# Patient Record
Sex: FEMALE | Race: WHITE | NOT HISPANIC OR LATINO
[De-identification: names, ages, dates, MRNs, and addresses within clinical notes are randomized per-mention and may not be internally consistent; named-entity substitution may affect disease eponyms.]

---

## 2018-06-26 ENCOUNTER — APPOINTMENT (OUTPATIENT)
Dept: UROGYNECOLOGY | Facility: CLINIC | Age: 53
End: 2018-06-26
Payer: COMMERCIAL

## 2018-06-26 VITALS
HEART RATE: 57 BPM | SYSTOLIC BLOOD PRESSURE: 148 MMHG | BODY MASS INDEX: 27.32 KG/M2 | DIASTOLIC BLOOD PRESSURE: 80 MMHG | WEIGHT: 170 LBS | HEIGHT: 66 IN

## 2018-06-26 DIAGNOSIS — N39.41 URGE INCONTINENCE: ICD-10-CM

## 2018-06-26 DIAGNOSIS — R39.15 URGENCY OF URINATION: ICD-10-CM

## 2018-06-26 DIAGNOSIS — Z83.42 FAMILY HISTORY OF FAMILIAL HYPERCHOLESTEROLEMIA: ICD-10-CM

## 2018-06-26 DIAGNOSIS — Z82.49 FAMILY HISTORY OF ISCHEMIC HEART DISEASE AND OTHER DISEASES OF THE CIRCULATORY SYSTEM: ICD-10-CM

## 2018-06-26 LAB
BILIRUB UR QL STRIP: NORMAL
CLARITY UR: CLEAR
COLLECTION METHOD: NORMAL
GLUCOSE UR-MCNC: NORMAL
HCG UR QL: 0.2 EU/DL
HGB UR QL STRIP.AUTO: NORMAL
KETONES UR-MCNC: NORMAL
LEUKOCYTE ESTERASE UR QL STRIP: NORMAL
NITRITE UR QL STRIP: NORMAL
PH UR STRIP: 6
PROT UR STRIP-MCNC: NORMAL
SP GR UR STRIP: 1.01

## 2018-06-26 PROCEDURE — 51701 INSERT BLADDER CATHETER: CPT

## 2018-06-26 PROCEDURE — 99244 OFF/OP CNSLTJ NEW/EST MOD 40: CPT | Mod: 25

## 2018-06-27 ENCOUNTER — RESULT REVIEW (OUTPATIENT)
Age: 53
End: 2018-06-27

## 2018-06-27 PROBLEM — Z83.42 FAMILY HISTORY OF HYPERCHOLESTEROLEMIA: Status: ACTIVE | Noted: 2018-06-27

## 2018-06-27 LAB
APPEARANCE: CLEAR
BACTERIA: NEGATIVE
BILIRUBIN URINE: NEGATIVE
BLOOD URINE: NEGATIVE
COLOR: YELLOW
GLUCOSE QUALITATIVE U: NEGATIVE MG/DL
HYALINE CASTS: 0 /LPF
KETONES URINE: NEGATIVE
LEUKOCYTE ESTERASE URINE: NEGATIVE
MICROSCOPIC-UA: NORMAL
NITRITE URINE: NEGATIVE
PH URINE: 6
PROTEIN URINE: NEGATIVE MG/DL
RED BLOOD CELLS URINE: 0 /HPF
SPECIFIC GRAVITY URINE: 1.01
SQUAMOUS EPITHELIAL CELLS: 0 /HPF
UROBILINOGEN URINE: NEGATIVE MG/DL
WHITE BLOOD CELLS URINE: 0 /HPF

## 2018-06-27 RX ORDER — CHROMIUM 200 MCG
TABLET ORAL
Refills: 0 | Status: ACTIVE | COMMUNITY

## 2018-06-28 ENCOUNTER — RESULT REVIEW (OUTPATIENT)
Age: 53
End: 2018-06-28

## 2018-06-28 LAB — BACTERIA UR CULT: NORMAL

## 2018-06-29 ENCOUNTER — RESULT REVIEW (OUTPATIENT)
Age: 53
End: 2018-06-29

## 2019-04-08 ENCOUNTER — APPOINTMENT (OUTPATIENT)
Dept: UROGYNECOLOGY | Facility: CLINIC | Age: 54
End: 2019-04-08

## 2019-04-15 ENCOUNTER — APPOINTMENT (OUTPATIENT)
Dept: UROGYNECOLOGY | Facility: CLINIC | Age: 54
End: 2019-04-15

## 2019-08-28 ENCOUNTER — APPOINTMENT (OUTPATIENT)
Dept: ULTRASOUND IMAGING | Facility: IMAGING CENTER | Age: 54
End: 2019-08-28
Payer: COMMERCIAL

## 2019-08-28 ENCOUNTER — OUTPATIENT (OUTPATIENT)
Dept: OUTPATIENT SERVICES | Facility: HOSPITAL | Age: 54
LOS: 1 days | End: 2019-08-28
Payer: COMMERCIAL

## 2019-08-28 DIAGNOSIS — Z00.8 ENCOUNTER FOR OTHER GENERAL EXAMINATION: ICD-10-CM

## 2019-08-28 PROCEDURE — 76856 US EXAM PELVIC COMPLETE: CPT

## 2019-08-28 PROCEDURE — 76830 TRANSVAGINAL US NON-OB: CPT

## 2019-08-28 PROCEDURE — 76856 US EXAM PELVIC COMPLETE: CPT | Mod: 26,59

## 2019-08-28 PROCEDURE — 76830 TRANSVAGINAL US NON-OB: CPT | Mod: 26

## 2019-09-12 ENCOUNTER — EMERGENCY (EMERGENCY)
Facility: HOSPITAL | Age: 54
LOS: 1 days | Discharge: HOME | End: 2019-09-12
Attending: EMERGENCY MEDICINE
Payer: COMMERCIAL

## 2019-09-12 VITALS
HEIGHT: 66 IN | OXYGEN SATURATION: 99 % | HEART RATE: 61 BPM | WEIGHT: 169.98 LBS | TEMPERATURE: 98 F | RESPIRATION RATE: 16 BRPM | DIASTOLIC BLOOD PRESSURE: 77 MMHG | SYSTOLIC BLOOD PRESSURE: 174 MMHG

## 2019-09-12 LAB
ALBUMIN SERPL ELPH-MCNC: 4.7 G/DL — SIGNIFICANT CHANGE UP (ref 3.3–5)
ALP SERPL-CCNC: 63 U/L — SIGNIFICANT CHANGE UP (ref 40–120)
ALT FLD-CCNC: 18 U/L — SIGNIFICANT CHANGE UP (ref 10–45)
ANION GAP SERPL CALC-SCNC: 15 MMOL/L — SIGNIFICANT CHANGE UP (ref 5–17)
AST SERPL-CCNC: 20 U/L — SIGNIFICANT CHANGE UP (ref 10–40)
BILIRUB SERPL-MCNC: 1.3 MG/DL — HIGH (ref 0.2–1.2)
BUN SERPL-MCNC: 15 MG/DL — SIGNIFICANT CHANGE UP (ref 7–23)
CALCIUM SERPL-MCNC: 10 MG/DL — SIGNIFICANT CHANGE UP (ref 8.4–10.5)
CHLORIDE SERPL-SCNC: 103 MMOL/L — SIGNIFICANT CHANGE UP (ref 96–108)
CO2 SERPL-SCNC: 25 MMOL/L — SIGNIFICANT CHANGE UP (ref 22–31)
CREAT SERPL-MCNC: 0.74 MG/DL — SIGNIFICANT CHANGE UP (ref 0.5–1.3)
GLUCOSE SERPL-MCNC: 101 MG/DL — HIGH (ref 70–99)
HCT VFR BLD CALC: 40.4 % — SIGNIFICANT CHANGE UP (ref 34.5–45)
HGB BLD-MCNC: 13.2 G/DL — SIGNIFICANT CHANGE UP (ref 11.5–15.5)
MCHC RBC-ENTMCNC: 31.7 PG — SIGNIFICANT CHANGE UP (ref 27–34)
MCHC RBC-ENTMCNC: 32.6 GM/DL — SIGNIFICANT CHANGE UP (ref 32–36)
MCV RBC AUTO: 97.2 FL — SIGNIFICANT CHANGE UP (ref 80–100)
PLATELET # BLD AUTO: 256 K/UL — SIGNIFICANT CHANGE UP (ref 150–400)
POTASSIUM SERPL-MCNC: 4 MMOL/L — SIGNIFICANT CHANGE UP (ref 3.5–5.3)
POTASSIUM SERPL-SCNC: 4 MMOL/L — SIGNIFICANT CHANGE UP (ref 3.5–5.3)
PROT SERPL-MCNC: 7.8 G/DL — SIGNIFICANT CHANGE UP (ref 6–8.3)
RBC # BLD: 4.16 M/UL — SIGNIFICANT CHANGE UP (ref 3.8–5.2)
RBC # FLD: 11.7 % — SIGNIFICANT CHANGE UP (ref 10.3–14.5)
SODIUM SERPL-SCNC: 143 MMOL/L — SIGNIFICANT CHANGE UP (ref 135–145)
TROPONIN T, HIGH SENSITIVITY RESULT: 8 NG/L — SIGNIFICANT CHANGE UP (ref 0–51)
TROPONIN T, HIGH SENSITIVITY RESULT: <6 NG/L — SIGNIFICANT CHANGE UP (ref 0–51)
WBC # BLD: 5.6 K/UL — SIGNIFICANT CHANGE UP (ref 3.8–10.5)
WBC # FLD AUTO: 5.6 K/UL — SIGNIFICANT CHANGE UP (ref 3.8–10.5)

## 2019-09-12 PROCEDURE — 93010 ELECTROCARDIOGRAM REPORT: CPT

## 2019-09-12 PROCEDURE — 99284 EMERGENCY DEPT VISIT MOD MDM: CPT | Mod: GC

## 2019-09-12 PROCEDURE — 99218: CPT

## 2019-09-12 PROCEDURE — 71046 X-RAY EXAM CHEST 2 VIEWS: CPT | Mod: 26

## 2019-09-12 PROCEDURE — 75574 CT ANGIO HRT W/3D IMAGE: CPT | Mod: 26

## 2019-09-12 RX ORDER — ATORVASTATIN CALCIUM 80 MG/1
40 TABLET, FILM COATED ORAL AT BEDTIME
Refills: 0 | Status: DISCONTINUED | OUTPATIENT
Start: 2019-09-12 | End: 2019-09-16

## 2019-09-12 RX ORDER — SIMVASTATIN 20 MG/1
20 TABLET, FILM COATED ORAL AT BEDTIME
Refills: 0 | Status: DISCONTINUED | OUTPATIENT
Start: 2019-09-12 | End: 2019-09-12

## 2019-09-12 RX ORDER — LOSARTAN POTASSIUM 100 MG/1
100 TABLET, FILM COATED ORAL DAILY
Refills: 0 | Status: DISCONTINUED | OUTPATIENT
Start: 2019-09-12 | End: 2019-09-16

## 2019-09-12 RX ORDER — ASPIRIN/CALCIUM CARB/MAGNESIUM 324 MG
324 TABLET ORAL ONCE
Refills: 0 | Status: COMPLETED | OUTPATIENT
Start: 2019-09-12 | End: 2019-09-12

## 2019-09-12 RX ORDER — LOSARTAN POTASSIUM 100 MG/1
50 TABLET, FILM COATED ORAL DAILY
Refills: 0 | Status: DISCONTINUED | OUTPATIENT
Start: 2019-09-12 | End: 2019-09-12

## 2019-09-12 RX ADMIN — ATORVASTATIN CALCIUM 40 MILLIGRAM(S): 80 TABLET, FILM COATED ORAL at 21:11

## 2019-09-12 RX ADMIN — LOSARTAN POTASSIUM 100 MILLIGRAM(S): 100 TABLET, FILM COATED ORAL at 21:11

## 2019-09-12 RX ADMIN — Medication 324 MILLIGRAM(S): at 13:43

## 2019-09-12 NOTE — ED PROVIDER NOTE - PHYSICAL EXAMINATION
General: well appearing, interactive, well nourished, no apparent distress, ncat  HEENT: EOMI, PERRLA, normal mucosa, normal oropharynx, no lesions on the lips or on oral mucosa, normal external ear  Neck: supple, no lymphadenopathy, full range of motion, no nuchal rigidity  CV: RRR, normal S1 and S2 with no murmur, capillary refill less than two seconds, pp 2+  Resp: lungs CTA b/l, good aeration bilaterally, symmetric chest wall   Abd: non-distended, soft, non-tender  : no CVA tenderness  MSK: full range of motion, no cyanosis, no edema, no clubbing, no immobility  Neuro: CN2-12 grossly intact. EOMI. 5/5 strength in UE and LE b/l.  Sensation intact in UE/LE b/l b/l.  No dysdiadochokinesia.   Skin: no rashes, skin intact

## 2019-09-12 NOTE — ED ADULT NURSE NOTE - OBJECTIVE STATEMENT
54 year old female patient presents via wheelchair from work c/o dizziness and high blood pressure. 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 Patient states that once she arrived in the ED at 10am, she started having tightness to jaw and numbness and tingling to b/l hands. Patient denies previous similar episodes and states she was otherwise feeling well prior to today, but noted BP to be elevated at orthopedists apt earlier in the week. Patient reports recent med change where she was taken off of hydrochlorothiazide about a month ago by request. Patient denies current chest pain, palpitations, SOB, abd pain, n/v/d, fever, chills. Patient appears anxious but in no acute distress. EKG completed and patient placed on CM

## 2019-09-12 NOTE — ED CDU PROVIDER INITIAL DAY NOTE - ATTENDING CONTRIBUTION TO CARE
54F, pmh htn, presents with chest pain, jaw tightness, onset @ ~930 AM. tightness across chest, constant, radiates to jaw. a/w lightheadedness, tingling to both hands. no associated sob. no cough, congestion, f/c. no n/v/d. no abd pain. denies calf pain/swelling. denies hx pe/dvt.    PE: NAD, NCAT, MMM, Trachea midline, Normal conjunctiva, lungs CTAB, S1/S2 RRR, Normal perfusion, 2+ radial pulses bilat, Abdomen Soft, NTND, No rebound/guarding, No LE edema, No deformity of extremities, No rashes,  No focal motor or sensory deficits.    In ED, trop negative, labs otherwise non-actionable. CXR unremarkable. Plan for cdu for nuclear stress, monitoring, re-eval. - Miguel A Mathias MD

## 2019-09-12 NOTE — CONSULT NOTE ADULT - NSHPATTENDINGPLANDISCUSS_GEN_ALL_CORE
Plan discussed with cardiology fellow, Dr. Chapman; patient seen and examined.       I was physically present for the key portions of the evaluation and management (E/M) service provided.    I agree with the above history, physical, and plan which I have reviewed and edited where appropriate.

## 2019-09-12 NOTE — ED CDU PROVIDER INITIAL DAY NOTE - FAMILY HISTORY
Grandparent  Still living? Unknown  Family history of coronary artery disease, Age at diagnosis: Age Unknown

## 2019-09-12 NOTE — ED CDU PROVIDER INITIAL DAY NOTE - CHIEF COMPLAINT
The patient is a 54y Female complaining of dizziness. The patient is a 54y Female complaining of jaw tightness

## 2019-09-12 NOTE — CONSULT NOTE ADULT - SUBJECTIVE AND OBJECTIVE BOX
Patient seen and evaluated at bedside    Chief Complaint:  chest pain    HPI: 55 y/o F w/ hx of HTN, HLD p/w chest pain for 1 day.  Pt states about 1 month ago she discontinued her HCTZ for BP management.  Pt states she noticed a slow increase in BP since then, BP normally well controlled w/ -120, however over the last few days with  to 160.  Pt was at work today where she states she felt a little lightheaded with jaw tightness, had her BP checked and it was 200.  Pt was instructed to go down to the ED, where she was given ASA 325mg.  2 hours later while in the ED, she had a mild self-limiting episode of substernal chest pressure.  Since then, pt has been chest pain free however had a CT coronaries which showed possible non-obstructed CAD with RCA 50-60%.  Pt states she normally runs 14 miles without issue. BP had came down on its own, and cardiology was consulted for evaluation of jaw claudication and chest pain.    PMHx:   Hypertension      PSHx: breast reduction surgery      Allergies:  procaine (Anaphylaxis)      Home Meds:  simvastatin 20mg, losartan 50mg daily    Current Medications:   atorvastatin 40 milliGRAM(s) Oral at bedtime  losartan 100 milliGRAM(s) Oral daily      FAMILY HISTORY:  Family history of coronary artery disease (Grandparent)      Social History:  Smoking History:denies  Alcohol Use:denies  Drug Use:denies    REVIEW OF SYSTEMS:  Constitutional:     [ x] negative [ ] fevers [ ] chills [ ] weight loss [ ] weight gain  HEENT:                  [x ] negative [ ] dry eyes [ ] eye irritation [ ] postnasal drip [ ] nasal congestion  CV:                         [x ] negative  [ ] chest pain [ ] orthopnea [ ] palpitations [ ] murmur  Resp:                     [ x] negative [ ] cough [ ] shortness of breath [ ] dyspnea [ ] wheezing [ ] sputum [ ]hemoptysis  GI:                          [x ] negative [ ] nausea [ ] vomiting [ ] diarrhea [ ] constipation [ ] abd pain [ ] dysphagia   :                        [ x] negative [ ] dysuria [ ] nocturia [ ] hematuria [ ] increased urinary frequency  Musculoskeletal: [ x] negative [ ] back pain [ ] myalgias [ ] arthralgias [ ] fracture  Skin:                       [x ] negative [ ] rash [ ] itch  Neurological:        [ x] negative [ ] headache [ ] dizziness [ ] syncope [ ] weakness [ ] numbness  Psychiatric:           [ x] negative [ ] anxiety [ ] depression  Endocrine:            [ x] negative [ ] diabetes [ ] thyroid problem  Heme/Lymph:      [ x] negative [ ] anemia [ ] bleeding problem  Allergic/Immune: [ x] negative [ ] itchy eyes [ ] nasal discharge [ ] hives [ ] angioedema    [x ] All other systems negative  [ ] Unable to assess ROS due to      Physical Exam:  T(F): 99 (09-12), Max: 99 (09-12)  HR: 48 (09-12) (48 - 61)  BP: 129/66 (09-12) (129/66 - 183/98)  RR: 18 (09-12)  SpO2: 96% (09-12)    GEN: NAD, comfortable, pleasant  HEENT: EOMI, MMM  CV:  RRR, normal s1/s2, no m/r/g, no JVD  PULM:  CTA b/l, no w/r/r  ABD: Soft, NT/ND, BS+  EXT: WWP, no c/c/e    Cardiovascular Diagnostic Testing:    EKG:  sinus zain, no ischemic changes    Imaging:     CTA coronaries  IMPRESSION:    1.  Total coronary artery calcium score: 84.    2.  Mixed plaques within the proximal and mid LAD with mild   (approximately 30%) stenosis.    3.  Mixed plaques within the proximal to mid RCA withmoderate   (approximately 50-60%) stenosis.    CXR: Personally reviewed    Labs: Personally reviewed                        13.2   5.6   )-----------( 256      ( 12 Sep 2019 10:59 )             40.4     09-12    143  |  103  |  15  ----------------------------<  101<H>  4.0   |  25  |  0.74    Ca    10.0      12 Sep 2019 10:59    TPro  7.8  /  Alb  4.7  /  TBili  1.3<H>  /  DBili  x   /  AST  20  /  ALT  18  /  AlkPhos  63  09-12 Chief Complaint:  chest pain    55 y/o F w/ hx of HTN, HLD p/w chest pain for 1 day.  Pt states about 1 month ago, she discontinued her HCTZ.  Pt states she noticed a slow increase in BP since then; BP previously had been well controlled w/ -120.  Over the  the last few days, SBP has ranged from 130 to 160.    Pt was at work today and felt a little lightheaded with jaw tightness; BP was checked and it was 200 mmHg systolic.  Pt was instructed to go down to the ED, where she was given ASA 325mg.    Two hours later while in the ED, she had a mild self-limiting episode of substernal chest pressure.  Since then, pt has been chest pain free.  CTA of coronaries performed which describes non-obstructived CAD, with RCA 50-60%.  Pt states she normally runs 14 miles without issue.       PMHx:   Hypertension    PSHx: breast reduction surgery    Allergies:  procaine (Anaphylaxis)    Home Meds:  simvastatin 20mg, losartan 50mg daily    Current Medications:   atorvastatin 40 milliGRAM(s) Oral at bedtime  losartan 100 milliGRAM(s) Oral daily    FAMILY HISTORY:  Family history of coronary artery disease (Grandparent)    Social History:  Smoking History:denies  Alcohol Use:denies  Drug Use:denies    REVIEW OF SYSTEMS:  Constitutional:     [ x] negative [ ] fevers [ ] chills [ ] weight loss [ ] weight gain  HEENT:                  [x ] negative [ ] dry eyes [ ] eye irritation [ ] postnasal drip [ ] nasal congestion  Resp:                     [ x] negative [ ] cough [ ] shortness of breath [ ] dyspnea [ ] wheezing [ ] sputum [ ]hemoptysis  GI:                          [x ] negative [ ] nausea [ ] vomiting [ ] diarrhea [ ] constipation [ ] abd pain [ ] dysphagia   :                        [ x] negative [ ] dysuria [ ] nocturia [ ] hematuria [ ] increased urinary frequency  Musculoskeletal: [ x] negative [ ] back pain [ ] myalgias [ ] arthralgias [ ] fracture  Skin:                       [x ] negative [ ] rash [ ] itch  Neurological:        [ x] negative [ ] headache [ ] dizziness [ ] syncope [ ] weakness [ ] numbness  Psychiatric:           [ x] negative [ ] anxiety [ ] depression  Endocrine:            [ x] negative [ ] diabetes [ ] thyroid problem  Heme/Lymph:      [ x] negative [ ] anemia [ ] bleeding problem  Allergic/Immune: [ x] negative [ ] itchy eyes [ ] nasal discharge [ ] hives [ ] angioedema    [x ] All other systems negative  [ ] Unable to assess ROS due to      Physical Exam:  T(F): 99 (), Max: 99 ()  HR: 48 () (48 - 61)  BP: 129/66 (-12) (129/66 - 183/98)  RR: 18 (-)  SpO2: 96% ()    EXAM:  GENERAL:  Alert, no distress  HEENT: Normocephalic, EOM intact, PERRLA  NECK: Normal carotid upstrokes, no bruit; No JVD  CHEST:  Clear to auscultation  HEART: PMI not displaced. Normal S1 and S2.  No murmur, rub or gallop.  ABDOMEN: Normal bowel sounds, no bruit. Soft, non-tender.  Liver and spleen not palpable; aorta not palpable.  EXT:  No edema, no varicosities, 2+ pulses in upper and lower extremities.  PSYCH:  A&Ox3, normal insight, no anxiety  NEURO: Non-focal  SKIN:  No rash       12 Lead ECG (19 @ 10:48)   SB at 53 BPM   P-R Interval 168 ms, QRS Duration 94 ms, Q-T Interval 420 ms   Axis 9 degrees   SINUS BRADYCARDIA, OTHERWISE NORMAL ECG        CT Heart with Coronaries (19 @ 15:13) >    INTERPRETATION:  Clinical indication: Chest pain, coronary atherosclerosis      Findings:    CALCIUM SCORE:  Left Main: calcium score equals 0.  LAD: calcium score equals 45.  CX:  calcium score equals 23.  RCA: calcium score equals 16.  TOTAL: calcium score equals 84.      Coronary Anatomy: There is no evidence for anomalous coronary arteries.     CORONARY ARTERY ANGIOGRAM FINDINGS:    Left main: The left main is a normal caliber vessel which gives rise to the LAD and circumflex arteries. No plaque or stenosis.        LAD: The LAD is a normal caliber vessel. Mixed plaques within the proximal and mid LAD with mild (approximately 30%) stenosis.      LCX: The circumflex artery is a normal caliber codominant vessel. No plaque or stenosis within the LCx. Calcified plaque within the OM1 branch of LCx with minimal (approximately 10%) stenosis.    RCA: The right coronary artery is codominant giving rise to the posterior descending artery. Mixed plaques within the proximal to mid RCA with moderate (approximately 50-60%) stenosis.    DOMINANCE: Codominant coronary artery system.    ADDITIONAL FINDINGS: None.       IMPRESSION:  1.  Total coronary artery calcium score: 84.  2.  Mixed plaques within the proximal and mid LAD with mild (approximately 30%) stenosis.  3.  Mixed plaques within the proximal to mid RCA withmoderate (approximately 50-60%) stenosis.    The following is for reference ONLY and NOT a portion of the report -  ---------------------------------------------------------------------------  ----------  Note: For Coronary CTA at our facility,stenoses (except the LMain) are qualitatively graded as follows:  Minimal narrowin-25%    stenosis  Mild narrowin-40%  stenosis  Moderate narrowin-70%  stenosis  Severe narrowing:     >70%    stenosis  ---------------------------------------------------------------------------    MARCIN LOPEZ M.D., ATTENDING RADIOLOGIST       Labs:                      13.2   5.6   )-----------( 256      ( 12 Sep 2019 10:59 )             40.4       143  |  103  |  15  ----------------------------<  101<H>  4.0   |  25  |  0.74    Ca    10.0      12 Sep 2019 10:59    TPro  7.8  /  Alb  4.7  /  TBili  1.3<H>  /  DBili  x   /  AST  20  /  ALT  18  /  AlkPhos  63   Chief Complaint:  chest pain    53 y/o F w/ hx of HTN, HLD p/w chest pain for 1 day.  Pt states about 1 month ago, she saw her MD and HCTZ was d/c'd due to concern of increased photosensitivity of the skin.  Pt states she noticed some higher  BP reading since then; BP previously had been well controlled w/ -120.  Over the  the last few days, SBP has ranged from 130 to 160.    Pt was at work here as a hospice RN on , and she felt vaguely unwell, with sense of lightheadedness, upper jaw tightness and tingling in both hands.  BP was checked and it was 200/110 mmHg.  Pt was sent ED, where she was given ASA 325mg.    CTA of coronaries performed which describes non-obstructive CAD, with RCA 50-60%.  Pt states she normally runs 14 miles without issue.       PMHx:     Hypertension  HLD    PSHx: breast reduction surgery    Allergies:  procaine (Anaphylaxis)    Home Meds:  simvastatin 20mg, losartan 50mg daily    Current Medications:   simvastatin 20 milliGRAM(s) Oral at bedtime  losartan 100 milliGRAM(s) Oral daily    FAMILY HISTORY:  Family history of coronary artery disease (Grandparent)    Social History:  Smoking History:denies  Alcohol Use:denies  Drug Use:denies    REVIEW OF SYSTEMS:  Constitutional:     [ x] negative [ ] fevers [ ] chills [ ] weight loss [ ] weight gain  HEENT:                  [x ] negative [ ] dry eyes [ ] eye irritation [ ] postnasal drip [ ] nasal congestion  Resp:                     [ x] negative [ ] cough [ ] shortness of breath [ ] dyspnea [ ] wheezing [ ] sputum [ ]hemoptysis  GI:                          [x ] negative [ ] nausea [ ] vomiting [ ] diarrhea [ ] constipation [ ] abd pain [ ] dysphagia   :                        [ x] negative [ ] dysuria [ ] nocturia [ ] hematuria [ ] increased urinary frequency  Musculoskeletal: [ x] negative [ ] back pain [ ] myalgias [ ] arthralgias [ ] fracture  Skin:                       [x ] negative [ ] rash [ ] itch  Neurological:        [ x] negative [ ] headache [ ] dizziness [ ] syncope [ ] weakness [ ] numbness  Psychiatric:           [ x] negative [ ] anxiety [ ] depression  Endocrine:            [ x] negative [ ] diabetes [ ] thyroid problem  Heme/Lymph:      [ x] negative [ ] anemia [ ] bleeding problem  Allergic/Immune: [ x] negative [ ] itchy eyes [ ] nasal discharge [ ] hives [ ] angioedema    [x ] All other systems negative  [ ] Unable to assess ROS due to      Physical Exam:  T(F): 99 (-), Max: 99 ()  HR: 48 () (48 - 61)  BP: 129/66 () (129/66 - 183/98)  RR: 18 ()  SpO2: 96% ()    EXAM:  GENERAL:  Alert, no distress  HEENT: Normocephalic, EOM intact, PERRLA  NECK: Normal carotid upstrokes, no bruit; No JVD  CHEST:  Clear to auscultation  HEART: PMI not displaced. Normal S1 and S2.  No murmur, rub or gallop.  ABDOMEN: Normal bowel sounds, no bruit. Soft, non-tender.  Liver and spleen not palpable; aorta not palpable.  EXT:  No edema, no varicosities, 2+ pulses in upper and lower extremities.  PSYCH:  A&Ox3, normal insight, no anxiety  NEURO: Non-focal  SKIN:  No rash       12 Lead ECG (19 @ 10:48)   SB at 53 BPM   P-R Interval 168 ms, QRS Duration 94 ms, Q-T Interval 420 ms   Axis 9 degrees   SINUS BRADYCARDIA, OTHERWISE NORMAL ECG        CT Heart with Coronaries (19 @ 15:13) >    INTERPRETATION:  Clinical indication: Chest pain, coronary atherosclerosis      Findings:    CALCIUM SCORE:  Left Main: calcium score equals 0.  LAD: calcium score equals 45.  CX:  calcium score equals 23.  RCA: calcium score equals 16.  TOTAL: calcium score equals 84.      Coronary Anatomy: There is no evidence for anomalous coronary arteries.     CORONARY ARTERY ANGIOGRAM FINDINGS:    Left main: The left main is a normal caliber vessel which gives rise to the LAD and circumflex arteries. No plaque or stenosis.        LAD: The LAD is a normal caliber vessel. Mixed plaques within the proximal and mid LAD with mild (approximately 30%) stenosis.      LCX: The circumflex artery is a normal caliber codominant vessel. No plaque or stenosis within the LCx. Calcified plaque within the OM1 branch of LCx with minimal (approximately 10%) stenosis.    RCA: The right coronary artery is codominant giving rise to the posterior descending artery. Mixed plaques within the proximal to mid RCA with moderate (approximately 50-60%) stenosis.    DOMINANCE: Codominant coronary artery system.    ADDITIONAL FINDINGS: None.       IMPRESSION:  1.  Total coronary artery calcium score: 84.  2.  Mixed plaques within the proximal and mid LAD with mild (approximately 30%) stenosis.  3.  Mixed plaques within the proximal to mid RCA withmoderate (approximately 50-60%) stenosis.    The following is for reference ONLY and NOT a portion of the report -  ---------------------------------------------------------------------------  ----------  Note: For Coronary CTA at our facility,stenoses (except the LMain) are qualitatively graded as follows:  Minimal narrowin-25%    stenosis  Mild narrowin-40%  stenosis  Moderate narrowin-70%  stenosis  Severe narrowing:     >70%    stenosis  ---------------------------------------------------------------------------    MARCIN LOPEZ M.D., ATTENDING RADIOLOGIST       Labs:                      13.2   5.6   )-----------( 256      ( 12 Sep 2019 10:59 )             40.4       143  |  103  |  15  ----------------------------<  101<H>  4.0   |  25  |  0.74    Ca    10.0      12 Sep 2019 10:59    TPro  7.8  /  Alb  4.7  /  TBili  1.3<H>  /  DBili  x   /  AST  20  /  ALT  18  /  AlkPhos  63

## 2019-09-12 NOTE — ED CDU PROVIDER DISPOSITION NOTE - CARE PROVIDER_API CALL
Rishi Ocampo (MD)  Cardiovascular Disease; Internal Medicine  [address] [facility], [facility] [address]  [place] [address]  Phone: [phone]  Fax: [phone]  Follow Up Time:

## 2019-09-12 NOTE — ED PROVIDER NOTE - ATTENDING CONTRIBUTION TO CARE
54F, pmh htn, presents with chest pain, jaw tightness, onset @ ~930 AM. tightness across chest, constant, radiates to jaw. a/w lightheadedness, tingling to both hands. no associated sob. no cough, congestion, f/c. no n/v/d. no abd pain. denies calf pain/swelling. denies hx pe/dvt.    PE: NAD, NCAT, MMM, Trachea midline, Normal conjunctiva, lungs CTAB, S1/S2 RRR, Normal perfusion, 2+ radial pulses bilat, Abdomen Soft, NTND, No rebound/guarding, No LE edema, No deformity of extremities, No rashes,  No focal motor or sensory deficits.    EKG sinus bradycardia. Pt appears well. Concerning for potential acs. Without sob, other factors suggestive of pe. pain does not rad to back, is hypertensive but pain not sharp, very low suspicion of acute aortic pathology. obtain labs, cxr, give asa, anticipate admission vs cdu for stress test. - Miguel A Mathias MD

## 2019-09-12 NOTE — ED ADULT NURSE REASSESSMENT NOTE - COMFORT CARE
warm blanket provided/darkened lights/plan of care explained/ambulated to bathroom/po fluids offered/repositioned

## 2019-09-12 NOTE — CONSULT NOTE ADULT - ASSESSMENT
55 y/o F w/ hx of HTN, HLD p/w chest pain for 1 day, likely 2/2 severely elevated BP, however found 50-60% mRCA on CT coronary.    #??non-obstructive CAD  -EKG wnl, trop neg x1 (to repeat)  -pt with 50-60% RCA lesion on CT coronaries, however margin of error ~5-10%  -will re-eval with radiology in AM  -if concerning for obstructive lesion, will consider cardiac cath  -increase Atorvastatin 40mg qHS  -obtain TTE  -unable to start BB as pt bradycardic  -will check lipid panel (a1c normal 1 month ago)    #HTN  -initially p/w emergency w/ associated jaw claudication/chest pain  -unclear why after d/c of HCTZ 12.5mg became so elevated  -will increase Losartan 100mg daily (addl 50mg now)  -workup for possible nonobstructive CAD as above    Will discuss w/ attending  Nate Chapman MD  Cardiology Fellow - PGY 4  Text or Call: [phone]  For all New Consults and Questions:  [email or web address]   Login: [email or web address] 53 y/o F w/ hx of HTN, HLD       p/w chest pain for 1 day, likely 2/2 severely elevated BP, however found 50-60% mRCA on CT coronary.    #??non-obstructive CAD  -EKG wnl, trop neg x1 (to repeat)  -pt with 50-60% RCA lesion on CT coronaries, however margin of error ~5-10%  -will re-eval with radiology in AM  -if concerning for obstructive lesion, will consider cardiac cath  -increase Atorvastatin 40mg qHS  -obtain TTE  -unable to start BB as pt bradycardic  -will check lipid panel (a1c normal 1 month ago)    #HTN  -initially p/w emergency w/ associated jaw claudication/chest pain  -unclear why after d/c of HCTZ 12.5mg became so elevated  -will increase Losartan 100mg daily (addl 50mg now)  -workup for possible nonobstructive CAD as above    Will discuss w/ attending  Nate Chapman MD  Cardiology Fellow - PGY 4  Text or Call: [phone]  For all New Consults and Questions:  [email or web address]   Login: [email or web address] 53 y/o F w/ hx of HTN, HLD.  Episode of lightheadedness, upper jaw pain and hand tingling on 9/12.  50-60% mRCA seen on CT coronary with lesser dz. in L system.    REC:  #1.  CAD  - EKG wnl, trop neg x2   - pt with 50-60% RCA lesion on CT coronaries, however margin of error ~5-10%  - will arrange for stress thallium to assess perfusion of myocardium supplied by RCA  - increase statin 40mg qHS  - obtain TTE  - unable to add BB as pt bradycardic  - will check lipid panel (a1c normal 1 month ago)    #2.  HTN  - initially p/w emergency w/ associated jaw claudication/chest pain  - If stress test shows hypertensive response, could increase Losartan 100mg daily (addl 50mg now)      Nate Chapman MD  Cardiology Fellow - PGY 4  Text or Call: [phone]  For all New Consults and Questions:  [email or web address]   Login: tracie Ocampo M.D.  Cardiology Attending, Consult Service  [phone] 55 y/o F w/ hx of HTN, HLD.  Episode of lightheadedness, upper jaw pain and hand tingling on 9/12.  50-60% mRCA seen on CT coronary with lesser dz. in L system.    REC:  #1.  CAD  - EKG wnl, trop neg x2   - pt with 50-60% RCA lesion on CT coronaries, however margin of error ~5-10%  - will arrange for stress thallium to assess perfusion of myocardium supplied by RCA  - increase statin 40mg qHS  - obtain TTE  - unable to add BB as pt bradycardic  - will check lipid panel (a1c normal 1 month ago)  - should start ECASA 81 mg. daily    #2.  HTN  - initially p/w emergency w/ associated jaw claudication/chest pain  - If stress test shows hypertensive response, could increase Losartan 100mg daily (addl 50mg now)      Nate Chapman MD  Cardiology Fellow - PGY 4  Text or Call: [phone]  For all New Consults and Questions:  [email or web address]   Login: tracie Ocampo M.D.  Cardiology Attending, Consult Service  [phone]

## 2019-09-12 NOTE — ED CDU PROVIDER DISPOSITION NOTE - ATTENDING CONTRIBUTION TO CARE
I have seen and evaluated this patient with the advance practice clinician.   I agree with the findings  unless other wise stated. I have amended notes where needed.  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 the pt reports she was in her usual state of health this morning when she began to feel jaw tightness as well as mild light headed and tingling in both hands. she has never had similar symptoms before and became concerned. symptoms had resolved, but while in ED jaw tightness returned and with associated chest tightness.  no fevers/chills, SOB, chest pain, abdominal pain, nausea/vomiting, diarrhea/constipation. she is currently training for a marathon. denies use of any estrogen supplements. she denies toxic habits.  CTC- moderate stenosis 50-60% in RCA, 30% in LAD. Cardiology recommended Echocardiogram, along with Lipitor and Losartan. echo was normal. cardiology then recommended stress test. stress test was abnormal. pt was re-evaluated by cardiology. pt was discharged home with outpt cardiology follow up to arrange for outpt cardiac cath. will have her cardiologist follow up in 2 days and arrange for further evaluation  Compliance to diet and medicines stressed will have out patient follow up. Return instructions discussed with patient and patient understood --Trev

## 2019-09-12 NOTE — ED CDU PROVIDER DISPOSITION NOTE - CLINICAL COURSE
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 the pt reports she was in her usual state of health this morning when she began to feel jaw tightness as well as mild light headed and tingling in both hands. she has never had similar symptoms before and became concerned. symptoms had resolved, but while in ED jaw tightness returned and with associated chest tightness.  no fevers/chills, SOB, chest pain, abdominal pain, nausea/vomiting, diarrhea/constipation. she is currently training for a marathon. denies use of any estrogen supplements. she denies toxic habits.  CTC- moderate stenosis 50-60% in RCA, 30% in LAD. Cardiology recommended Echocardiogram, along with Lipitor and Losartan. 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 the pt reports she was in her usual state of health this morning when she began to feel jaw tightness as well as mild light headed and tingling in both hands. she has never had similar symptoms before and became concerned. symptoms had resolved, but while in ED jaw tightness returned and with associated chest tightness.  no fevers/chills, SOB, chest pain, abdominal pain, nausea/vomiting, diarrhea/constipation. she is currently training for a marathon. denies use of any estrogen supplements. she denies toxic habits.  CTC- moderate stenosis 50-60% in RCA, 30% in LAD. Cardiology recommended Echocardiogram, along with Lipitor and Losartan. echo was normal. cardiology then recommended stress test. stress test was abnormal. pt was re-evaluated by cardiology. pt was discharged home with outpt cardiology follow up to arrange for outpt cardiac cath.

## 2019-09-12 NOTE — ED PROVIDER NOTE - CLINICAL SUMMARY MEDICAL DECISION MAKING FREE TEXT BOX
54 year old woman with a PMH of HTN presenting with acute onset jaw tightness and b/l hand tingling with unremarkable physical exam. r/o MI. less concerning for stroke d/t lack of focal deficits on exam and clinic presentation. consider anxiety/panic attack    -cbc, bmp  -ekg  -troponin 54 year old woman with a PMH of HTN presenting with acute onset jaw tightness and b/l hand tingling with unremarkable physical exam. r/o MI. less concerning for stroke d/t lack of focal deficits on exam and clinic presentation. consider anxiety/panic attack    -cbc, bmp  -ekg  -troponin  - celestino, medical student    55 yo f pmh htn, hld, pw acute onset paresthesias in extremities, "jaw tightness" and sense of dread. 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 earlier had "chest tightness", which resolved. after noticing sx, pt took bp and found it to be elevated. was advised to go to ED for eval. pt is currently training for marathon, was taken off of HCTZ for photo-sensitivity. has been compliant w/ losartan. has not had stress/echo many years ago. reports fam hx of cardiac disease in maternal side, MIs in 70s. denies cp, sob, exertional cp, emesis, ha. pt well appearing, nad, bp mildly elevated, afebrile. no neuro deficits. pulses intact. ekg, labs, reassess.

## 2019-09-12 NOTE — ED CDU PROVIDER INITIAL DAY NOTE - OBJECTIVE STATEMENT
54 year old female with a PMH of HTN presenting with sudden onset chest and jaw tightness at 0930. the pt reports she was in her usual state of health this morning when she began to feel chest and jaw tightness as well as light headed and tingling in both hands. she has never had similar symptoms before. no fevers/chills, SOB, chest pain, abdominal pain, nausea/vomiting, diarrhea/constipation. she is currently training for a marathon. denies use of any estrogen supplements. she denies toxic habits. 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 the pt reports she was in her usual state of health this morning when she began to feel jaw tightness as well as mild light headed and tingling in both hands. she has never had similar symptoms before and became concerned. symptoms had resolved, but while in ED jaw tightness returned and with associated chest tightness.  no fevers/chills, SOB, chest pain, abdominal pain, nausea/vomiting, diarrhea/constipation. she is currently training for a marathon. denies use of any estrogen supplements. she denies toxic habits.

## 2019-09-12 NOTE — ED CDU PROVIDER INITIAL DAY NOTE - PROGRESS NOTE DETAILS
CDU NOTE MINE Hughes: pt went for CTC. CDU NOTE MINE Hughes: pt resting comfortably, feels well except for minimal jaw tightness. no chest tightness. NAD VSS. no events on tele. CTC- moderate stenosis 50-60% in RCA, 30% in LAD. pt prefers Dr. Hwang from Cardiology, friend has his direct cell phone number and will contact him directly and have him call me. CDU NOTE MINE Hughes: called on call Cards fellow for consult. Patient seen at bedside. VSS. No acute events on telemetry. Patient resting comfortably, no complaints. Denies chest discomfort although states that she has minor jaw discomfort intermittently. Will reevaluate. - Madiha Rocha PA-C Spoke with Cardiology fellow, recommends Echo in AM, Lipitor 40, Losartan 100, will see patient in AM. - Madiha Rocha PA-C

## 2019-09-12 NOTE — ED PROVIDER NOTE - OBJECTIVE STATEMENT
54 year old female with a PMH of HTN presenting with sudden onset chest and jaw tightness one hour ago. the pt reports she was in her usual state of health this morning when she began to feel chest and jaw tightness as well as light headed and tingling in both hands. she has never had similar symptoms before. no fevers/chills, SOB, chest pain, abdominal pain, nausea/vomiting, diarrhea/constipation. she is currently training for a marathon. denies use of any estrogen supplements. she denies toxic habits. 54 year old female with a PMH of HTN presenting with sudden onset chest and jaw tightness at 0930. the pt reports she was in her usual state of health this morning when she began to feel chest and jaw tightness as well as light headed and tingling in both hands. she has never had similar symptoms before. no fevers/chills, SOB, chest pain, abdominal pain, nausea/vomiting, diarrhea/constipation. she is currently training for a marathon. denies use of any estrogen supplements. she denies toxic habits.

## 2019-09-12 NOTE — ED CDU PROVIDER DISPOSITION NOTE - NSFOLLOWUPINSTRUCTIONS_ED_ALL_ED_FT
1. Rest. Stay hydrated.   2. Continue your current home medications. Please take Losartan 100mg daily.  Please take Atorvastatin 40mg daily.   3. Follow-up with your medical doctor in 2-3 days. Please follow up with Dr. Hwang, cardiology. Bring your results with you for follow-up.  4. Return to the ER if you have any new or worsening symptoms, chest pain, difficulty breathing, fevers, chills, weakness, or any other concerns. 1. Rest. Stay hydrated.   2. Continue your current home medications. Take Losartan 100mg daily, Atorvastatin 40mg daily (stop previous cholesterol medication), and take Aspirin 81mg daily.   3. Follow up with your cardiologist Dr. Hwang in 2-3 days to schedule a cardiac catheterization as an outpatient.  Follow-up with your medical doctor in 2-3 days. Bring your test results with you for follow-up.  4. Return to the ER if you have any new or worsening symptoms, chest pain, difficulty breathing, fevers, chills, weakness, or any other concerns. 1. Rest. Stay hydrated.   2. Continue your current home medications. Take Losartan 100mg daily, Atorvastatin 40mg daily (stop previous cholesterol medication), and take Aspirin 81mg daily.   3. Follow up with your cardiologist in 2-3 days. Call Dr. Ocampo to schedule a cardiac catheterization as an outpatient.  Follow-up with your medical doctor in 2-3 days. Bring your test results with you for follow-up.  4. Return to the ER if you have any new or worsening symptoms, chest pain, difficulty breathing, fevers, chills, weakness, or any other concerns.

## 2019-09-12 NOTE — ED ADULT NURSE REASSESSMENT NOTE - NS ED NURSE REASSESS COMMENT FT1
Pt report received from Brittani MATHIS. Pt transferred to cdu BED 2 for. Pt a/ox3 denies respiratory distress, sob, dyspnea, C/P, palpitations, n/v/d at this time. Pt states symptoms have improved. VSS, afebrile, IV clean/dry/intact. Pt aware of plan of care, call bell within reach ,safety maintained. Will monitor and assess.
Report given to CDU RN . Pt aware of bed assignment. Pt currently comfortable. VSS.
Received pt from DEL Evans , received pt alert and responsive, oriented x4, denies any respiratory distress, SOB, or difficulty breathing. Pt transferred to CDU for cardia w/u. Pt is currently asymptomatic with no complaints of chest pain/ pressure, SOB, diaphoresis,. dizziness, lightheadedness, N/V, F/C, heart palpitations. On telemetry pt is sinus bradycardia hr: 40's. IV in place, patent and free of signs of infiltration, V/S stable, pt afebrile, pt denies pain at this time. Pt educated on unit and unit rules, instructed patient to notify RN of any needed assistance, Pt verbalizes understanding, Call bell placed within reach. Safety maintained. Will continue to monitor. Spouse at bedside.

## 2019-09-12 NOTE — ED CDU PROVIDER DISPOSITION NOTE - PATIENT PORTAL LINK FT
You can access the FollowMyHealth Patient Portal offered by [facility] by registering at the following website: [email or web address]. By joining [facility]’s FollowMyHealth portal, you will also be able to view your health information using other applications (apps) compatible with our system.

## 2019-09-13 VITALS
OXYGEN SATURATION: 99 % | SYSTOLIC BLOOD PRESSURE: 158 MMHG | RESPIRATION RATE: 16 BRPM | DIASTOLIC BLOOD PRESSURE: 93 MMHG | TEMPERATURE: 98 F | HEART RATE: 58 BPM

## 2019-09-13 LAB
CHOLEST SERPL-MCNC: 160 MG/DL — SIGNIFICANT CHANGE UP (ref 10–199)
HBA1C BLD-MCNC: 5.3 % — SIGNIFICANT CHANGE UP (ref 4–5.6)
HDLC SERPL-MCNC: 51 MG/DL — SIGNIFICANT CHANGE UP
LIPID PNL WITH DIRECT LDL SERPL: 94 MG/DL — SIGNIFICANT CHANGE UP
TOTAL CHOLESTEROL/HDL RATIO MEASUREMENT: 3.1 RATIO — LOW (ref 3.3–7.1)
TRIGL SERPL-MCNC: 74 MG/DL — SIGNIFICANT CHANGE UP (ref 10–149)

## 2019-09-13 PROCEDURE — 93017 CV STRESS TEST TRACING ONLY: CPT

## 2019-09-13 PROCEDURE — 83036 HEMOGLOBIN GLYCOSYLATED A1C: CPT

## 2019-09-13 PROCEDURE — 99284 EMERGENCY DEPT VISIT MOD MDM: CPT | Mod: 25

## 2019-09-13 PROCEDURE — 93016 CV STRESS TEST SUPVJ ONLY: CPT

## 2019-09-13 PROCEDURE — 93306 TTE W/DOPPLER COMPLETE: CPT | Mod: 26

## 2019-09-13 PROCEDURE — 71046 X-RAY EXAM CHEST 2 VIEWS: CPT

## 2019-09-13 PROCEDURE — 93018 CV STRESS TEST I&R ONLY: CPT

## 2019-09-13 PROCEDURE — 99217: CPT

## 2019-09-13 PROCEDURE — 78452 HT MUSCLE IMAGE SPECT MULT: CPT

## 2019-09-13 PROCEDURE — 84484 ASSAY OF TROPONIN QUANT: CPT

## 2019-09-13 PROCEDURE — A9500: CPT

## 2019-09-13 PROCEDURE — 93306 TTE W/DOPPLER COMPLETE: CPT

## 2019-09-13 PROCEDURE — 80053 COMPREHEN METABOLIC PANEL: CPT

## 2019-09-13 PROCEDURE — 85027 COMPLETE CBC AUTOMATED: CPT

## 2019-09-13 PROCEDURE — 99284 EMERGENCY DEPT VISIT MOD MDM: CPT | Mod: GC

## 2019-09-13 PROCEDURE — 78452 HT MUSCLE IMAGE SPECT MULT: CPT | Mod: 26

## 2019-09-13 PROCEDURE — 93005 ELECTROCARDIOGRAM TRACING: CPT | Mod: XU

## 2019-09-13 PROCEDURE — 75574 CT ANGIO HRT W/3D IMAGE: CPT

## 2019-09-13 PROCEDURE — 80061 LIPID PANEL: CPT

## 2019-09-13 PROCEDURE — G0378: CPT

## 2019-09-13 RX ORDER — ATORVASTATIN CALCIUM 80 MG/1
1 TABLET, FILM COATED ORAL
Qty: 30 | Refills: 0
Start: 2019-09-13 | End: 2019-10-12

## 2019-09-13 RX ORDER — LOSARTAN POTASSIUM 100 MG/1
1 TABLET, FILM COATED ORAL
Qty: 30 | Refills: 0
Start: 2019-09-13 | End: 2019-10-12

## 2019-09-13 NOTE — ED CDU PROVIDER SUBSEQUENT DAY NOTE - PROGRESS NOTE DETAILS
Patient seen at bedside. VSS. No acute events on telemetry. Patient resting comfortably, no complaints.  Denies chest pain. Will reevaluate. - Madiha Rocha PA-C Pt comfortable. No complaints. Vital signs stable. Will continue to observe and reassess. Awaiting echo. -Estephanie Urias PA-C Pt comfortable. No complaints. Vital signs stable. Will continue to observe and reassess. Seen by cardiology Dr. Ocampo who recommends stress test. discussed with Dr. Rios. -Estephanie Urias PA-C Pt in stress lab. -Estephanie Urias PA-C Pt comfortable. No complaints. Vital signs stable. Stress test abnormal. reviewed results with cardiology Dr. Ocampo, he will follow up with pt as an outpt to schedule an outpt cardiac cath. pt to go home on increased dose of losartan, lipitor, and asa 81. discussed with Dr. Karimi, pt stable for d/c home. -Estephanie Urias PA-C

## 2019-09-13 NOTE — ED CDU PROVIDER SUBSEQUENT DAY NOTE - ATTENDING CONTRIBUTION TO CARE
ED attending Dr Nehemiah Rios note:  Patient re-evaluated and doing well.  No acute issues at  this time.  Lab and radiology tests reviewed with patient and/or family.    I have personally performed a face to face diagnostic evaluation on this patient.  I have reviewed the ACP note and agree with the history, exam, and plan of care, except as noted.  History and Exam by me showed improvement of sts, awaiting stress test results.

## 2019-09-13 NOTE — CHART NOTE - NSCHARTNOTEFT_GEN_A_CORE
Pt's CT, TTE, and Nuclear stress test reviewed. NST w/ evidence of infarct in the basal anterior/anteroseptum wall, CT w/ evidence of RCA disease, and normal TTE w/o segmental wall motion abnormalities. Given conflicting data, pt will require angiogram to conclusively evaluate for CAD. She is asymptomatic w/ no concerning EKG findings and negative troponins x2. Patient can be discharged with outpatient follow-up and [facility] can be scheduled as an outpatient. She was advised to return to ER if she develops any concerning symptoms such as chest pain, sob, or palpitations. Case d/w Dr. Terrence Herrera PGY4  [phone]  All Cardiology service information can be found 24/7 on [email or web address], password: [email or web address]

## 2019-09-17 ENCOUNTER — MEDICATION RENEWAL (OUTPATIENT)
Age: 54
End: 2019-09-17

## 2019-09-19 ENCOUNTER — INBOUND DOCUMENT (OUTPATIENT)
Age: 54
End: 2019-09-19

## 2019-09-23 ENCOUNTER — OUTPATIENT (OUTPATIENT)
Dept: OUTPATIENT SERVICES | Facility: HOSPITAL | Age: 54
LOS: 1 days | End: 2019-09-23
Payer: COMMERCIAL

## 2019-09-23 VITALS
WEIGHT: 164.91 LBS | DIASTOLIC BLOOD PRESSURE: 72 MMHG | TEMPERATURE: 98 F | HEART RATE: 63 BPM | SYSTOLIC BLOOD PRESSURE: 138 MMHG | OXYGEN SATURATION: 98 % | RESPIRATION RATE: 14 BRPM | HEIGHT: 66 IN

## 2019-09-23 DIAGNOSIS — Z98.890 OTHER SPECIFIED POSTPROCEDURAL STATES: Chronic | ICD-10-CM

## 2019-09-23 DIAGNOSIS — R07.89 OTHER CHEST PAIN: ICD-10-CM

## 2019-09-23 DIAGNOSIS — R93.1 ABNORMAL FINDINGS ON DIAGNOSTIC IMAGING OF HEART AND CORONARY CIRCULATION: ICD-10-CM

## 2019-09-23 LAB
ALBUMIN SERPL ELPH-MCNC: 4.6 G/DL — SIGNIFICANT CHANGE UP (ref 3.3–5)
ALP SERPL-CCNC: 76 U/L — SIGNIFICANT CHANGE UP (ref 40–120)
ALT FLD-CCNC: 17 U/L — SIGNIFICANT CHANGE UP (ref 10–45)
ANION GAP SERPL CALC-SCNC: 12 MMOL/L — SIGNIFICANT CHANGE UP (ref 5–17)
AST SERPL-CCNC: 19 U/L — SIGNIFICANT CHANGE UP (ref 10–40)
BILIRUB SERPL-MCNC: 0.7 MG/DL — SIGNIFICANT CHANGE UP (ref 0.2–1.2)
BUN SERPL-MCNC: 21 MG/DL — SIGNIFICANT CHANGE UP (ref 7–23)
CALCIUM SERPL-MCNC: 10.2 MG/DL — SIGNIFICANT CHANGE UP (ref 8.4–10.5)
CHLORIDE SERPL-SCNC: 104 MMOL/L — SIGNIFICANT CHANGE UP (ref 96–108)
CO2 SERPL-SCNC: 28 MMOL/L — SIGNIFICANT CHANGE UP (ref 22–31)
CREAT SERPL-MCNC: 0.73 MG/DL — SIGNIFICANT CHANGE UP (ref 0.5–1.3)
GLUCOSE SERPL-MCNC: 101 MG/DL — HIGH (ref 70–99)
HCT VFR BLD CALC: 41.8 % — SIGNIFICANT CHANGE UP (ref 34.5–45)
HGB BLD-MCNC: 13.7 G/DL — SIGNIFICANT CHANGE UP (ref 11.5–15.5)
MCHC RBC-ENTMCNC: 31.8 PG — SIGNIFICANT CHANGE UP (ref 27–34)
MCHC RBC-ENTMCNC: 32.8 GM/DL — SIGNIFICANT CHANGE UP (ref 32–36)
MCV RBC AUTO: 96.9 FL — SIGNIFICANT CHANGE UP (ref 80–100)
PLATELET # BLD AUTO: 225 K/UL — SIGNIFICANT CHANGE UP (ref 150–400)
POTASSIUM SERPL-MCNC: 3.7 MMOL/L — SIGNIFICANT CHANGE UP (ref 3.5–5.3)
POTASSIUM SERPL-SCNC: 3.7 MMOL/L — SIGNIFICANT CHANGE UP (ref 3.5–5.3)
PROT SERPL-MCNC: 7.3 G/DL — SIGNIFICANT CHANGE UP (ref 6–8.3)
RBC # BLD: 4.32 M/UL — SIGNIFICANT CHANGE UP (ref 3.8–5.2)
RBC # FLD: 11.4 % — SIGNIFICANT CHANGE UP (ref 10.3–14.5)
SODIUM SERPL-SCNC: 144 MMOL/L — SIGNIFICANT CHANGE UP (ref 135–145)
WBC # BLD: 5.1 K/UL — SIGNIFICANT CHANGE UP (ref 3.8–10.5)
WBC # FLD AUTO: 5.1 K/UL — SIGNIFICANT CHANGE UP (ref 3.8–10.5)

## 2019-09-23 PROCEDURE — C1769: CPT

## 2019-09-23 PROCEDURE — 99152 MOD SED SAME PHYS/QHP 5/>YRS: CPT

## 2019-09-23 PROCEDURE — 93458 L HRT ARTERY/VENTRICLE ANGIO: CPT | Mod: 26

## 2019-09-23 PROCEDURE — C1887: CPT

## 2019-09-23 PROCEDURE — 85027 COMPLETE CBC AUTOMATED: CPT

## 2019-09-23 PROCEDURE — 80053 COMPREHEN METABOLIC PANEL: CPT

## 2019-09-23 PROCEDURE — 93010 ELECTROCARDIOGRAM REPORT: CPT

## 2019-09-23 PROCEDURE — 93005 ELECTROCARDIOGRAM TRACING: CPT

## 2019-09-23 PROCEDURE — 93458 L HRT ARTERY/VENTRICLE ANGIO: CPT

## 2019-09-23 PROCEDURE — C1894: CPT

## 2019-09-23 RX ORDER — ASPIRIN/CALCIUM CARB/MAGNESIUM 324 MG
1 TABLET ORAL
Qty: 0 | Refills: 0 | DISCHARGE

## 2019-09-23 RX ORDER — LOSARTAN POTASSIUM 100 MG/1
1 TABLET, FILM COATED ORAL
Qty: 0 | Refills: 0 | DISCHARGE

## 2019-09-23 RX ORDER — LOSARTAN/HYDROCHLOROTHIAZIDE 100MG-25MG
1 TABLET ORAL
Qty: 0 | Refills: 0 | DISCHARGE

## 2019-09-23 RX ORDER — SIMVASTATIN 20 MG/1
1 TABLET, FILM COATED ORAL
Qty: 0 | Refills: 0 | DISCHARGE

## 2019-09-23 RX ORDER — CHOLECALCIFEROL (VITAMIN D3) 125 MCG
1 CAPSULE ORAL
Qty: 0 | Refills: 0 | DISCHARGE

## 2019-09-23 RX ORDER — ACETAMINOPHEN 500 MG
650 TABLET ORAL ONCE
Refills: 0 | Status: COMPLETED | OUTPATIENT
Start: 2019-09-23 | End: 2019-09-23

## 2019-09-23 RX ADMIN — Medication 650 MILLIGRAM(S): at 11:48

## 2019-09-23 RX ADMIN — Medication 650 MILLIGRAM(S): at 12:00

## 2019-09-23 NOTE — H&P CARDIOLOGY - HISTORY OF PRESENT ILLNESS
55 y/o  Female with PMHx of HTN, HLD presents today for [facility]. 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  BP was checked and it was 200/110 mmHg. Was sent to ER and had CTA of coronaries done which describes non-obstructive CAD, with RCA 50-60%.  Had NST done, revealed there is a medium sized moderate fixed defect in the basal anterior/ agustina septal wall consistent with scarring versus infarct There is a small sized mild mid to basal inferolateral defect that is reversible but predominantly correct with prone imaging and demonstrate normal wall motion consistent with attenuation artifact. 55 y/o  Female with PMHx of HTN, HLD presents today for [facility]. 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  BP was checked and it was 200/110 mmHg. Was sent to ER and had CTA of coronaries done which describes non-obstructive CAD, with RCA 50-60%.  Had NST done, revealed there is a medium sized moderate fixed defect in the basal anterior/ agustina septal wall consistent with scarring versus infarct. There is a small sized mild mid to basal inferolateral defect that is reversible but predominantly correct with prone imaging and demonstrate normal wall motion consistent with attenuation artifact. Denies SOB, palpitation syncope and denies any implanted cardiac device.

## 2019-09-24 PROBLEM — E78.5 HYPERLIPIDEMIA, UNSPECIFIED: Chronic | Status: ACTIVE | Noted: 2019-09-23

## 2019-10-02 ENCOUNTER — NON-APPOINTMENT (OUTPATIENT)
Age: 54
End: 2019-10-02

## 2019-10-02 ENCOUNTER — APPOINTMENT (OUTPATIENT)
Dept: CARDIOLOGY | Facility: CLINIC | Age: 54
End: 2019-10-02
Payer: COMMERCIAL

## 2019-10-02 VITALS
HEIGHT: 66 IN | WEIGHT: 171 LBS | DIASTOLIC BLOOD PRESSURE: 68 MMHG | BODY MASS INDEX: 27.48 KG/M2 | OXYGEN SATURATION: 96 % | SYSTOLIC BLOOD PRESSURE: 128 MMHG | HEART RATE: 66 BPM

## 2019-10-02 VITALS — DIASTOLIC BLOOD PRESSURE: 68 MMHG | SYSTOLIC BLOOD PRESSURE: 116 MMHG

## 2019-10-02 PROCEDURE — 99214 OFFICE O/P EST MOD 30 MIN: CPT

## 2019-10-02 PROCEDURE — 93000 ELECTROCARDIOGRAM COMPLETE: CPT

## 2019-10-02 RX ORDER — ASPIRIN 81 MG/1
81 TABLET ORAL
Refills: 0 | Status: ACTIVE | COMMUNITY

## 2019-10-02 NOTE — ASSESSMENT
[FreeTextEntry1] : \par CAD -30% lesion in mid LAD and 40% lesion in proximal RCA at cardiac cath on September 23, 2019 by Dr. Echeverria.\par \par Hyperlipidemia\par \par Hypertension

## 2019-10-02 NOTE — DISCUSSION/SUMMARY
[FreeTextEntry1] : \par CAD -30% lesion in mid LAD and 40% lesion in proximal RCA at cardiac cath on September 23, 2019 by Dr. Echeverria.\par Will continue enteric-coated aspirin 81 mg daily.  Will continue statin.\par \par Hyperlipidemia -Continue statin.  While I think atorvastatin was not the likely cause of the knee pain, I have no objection to going back to simvastatin which she had been on previously.  For now we will continue with 20 mg of simvastatin; when I see her at the next visit, we will recheck her cholesterol profile, and consider increasing the dose to 40 mg.\par \par Hypertension -under good control at this time.  Pressure reading in the left arm is slightly higher than the right, and I advised her to use that extremity  when she monitors her pressure at home.\par \par She asks if she can use Advil for musculoskeletal pain.  I have no objection to occasional use, such as once or twice a week.  If she were to need to use it for more extended period of time, it might be best that she hold the aspirin to avoid being on to nonsteroidal agents.\par \par She will return here in 2 months.

## 2019-10-02 NOTE — HISTORY OF PRESENT ILLNESS
[FreeTextEntry1] : She is 54 years of age.  She has a history of hypertension and hyperlipidemia.  \par \par She presented to the emergency department on September 12, feeling vaguely unwell, with a sense of lightheadedness and upper jaw tightness.  In the emergency department, a CT scan of the coronary arteries was performed, which described a lesion in the right coronary artery in the range of 50-60%.\par \par Stress isotope study was abnormal, with evidence of infarct affecting the  basal, anterior, and anteroseptal regions.\par \par Subsequently, a diagnostic cardiac catheterization was performed by Dr. Echeverria.  A 30% lesion was seen in the midportion of the LAD, and a 40% lesion was seen in the proximal portion of the dominant right coronary artery.\par \par At the time of discharge she was switched to atorvastatin from simvastatin.  She experienced knee pain over 2 days, and stopped atorvastatin, with resolution of that complaint.\par \par

## 2019-10-02 NOTE — PHYSICAL EXAM
[General Appearance - Well Developed] : well developed [Normal Appearance] : normal appearance [Well Groomed] : well groomed [General Appearance - Well Nourished] : well nourished [General Appearance - In No Acute Distress] : no acute distress [Normal Conjunctiva] : the conjunctiva exhibited no abnormalities [Eyelids - No Xanthelasma] : the eyelids demonstrated no xanthelasmas [Normal Jugular Venous A Waves Present] : normal jugular venous A waves present [Normal Jugular Venous V Waves Present] : normal jugular venous V waves present [No Jugular Venous Alex A Waves] : no jugular venous alex A waves [] : no respiratory distress [Respiration, Rhythm And Depth] : normal respiratory rhythm and effort [Auscultation Breath Sounds / Voice Sounds] : lungs were clear to auscultation bilaterally [Heart Rate And Rhythm] : heart rate and rhythm were normal [Bowel Sounds] : normal bowel sounds [Cyanosis, Localized] : no localized cyanosis [Nail Clubbing] : no clubbing of the fingernails [Skin Color & Pigmentation] : normal skin color and pigmentation [Skin Turgor] : normal skin turgor [No Venous Stasis] : no venous stasis [Skin Lesions] : no skin lesions [No Skin Ulcers] : no skin ulcer [No Xanthoma] : no  xanthoma was observed [Oriented To Time, Place, And Person] : oriented to person, place, and time [Impaired Insight] : insight and judgment were intact [Affect] : the affect was normal [Mood] : the mood was normal [FreeTextEntry1] : No bruit.  Soft, non-tender.  Liver and spleen not palpable; aortic pulsation not palpable.

## 2019-11-19 ENCOUNTER — APPOINTMENT (OUTPATIENT)
Dept: RADIOLOGY | Facility: IMAGING CENTER | Age: 54
End: 2019-11-19
Payer: COMMERCIAL

## 2019-11-19 ENCOUNTER — OUTPATIENT (OUTPATIENT)
Dept: OUTPATIENT SERVICES | Facility: HOSPITAL | Age: 54
LOS: 1 days | End: 2019-11-19
Payer: COMMERCIAL

## 2019-11-19 DIAGNOSIS — Z98.890 OTHER SPECIFIED POSTPROCEDURAL STATES: Chronic | ICD-10-CM

## 2019-11-19 DIAGNOSIS — Z00.8 ENCOUNTER FOR OTHER GENERAL EXAMINATION: ICD-10-CM

## 2019-11-19 PROCEDURE — 77080 DXA BONE DENSITY AXIAL: CPT

## 2019-11-19 PROCEDURE — 77080 DXA BONE DENSITY AXIAL: CPT | Mod: 26

## 2019-12-09 ENCOUNTER — RX RENEWAL (OUTPATIENT)
Age: 54
End: 2019-12-09

## 2019-12-16 ENCOUNTER — NON-APPOINTMENT (OUTPATIENT)
Age: 54
End: 2019-12-16

## 2019-12-16 ENCOUNTER — APPOINTMENT (OUTPATIENT)
Dept: CARDIOLOGY | Facility: CLINIC | Age: 54
End: 2019-12-16
Payer: COMMERCIAL

## 2019-12-16 VITALS
WEIGHT: 171 LBS | OXYGEN SATURATION: 97 % | HEART RATE: 60 BPM | BODY MASS INDEX: 27.6 KG/M2 | DIASTOLIC BLOOD PRESSURE: 78 MMHG | SYSTOLIC BLOOD PRESSURE: 120 MMHG

## 2019-12-16 VITALS — SYSTOLIC BLOOD PRESSURE: 132 MMHG | DIASTOLIC BLOOD PRESSURE: 76 MMHG

## 2019-12-16 PROCEDURE — 93000 ELECTROCARDIOGRAM COMPLETE: CPT

## 2019-12-16 PROCEDURE — 99214 OFFICE O/P EST MOD 30 MIN: CPT

## 2019-12-16 PROCEDURE — 36415 COLL VENOUS BLD VENIPUNCTURE: CPT

## 2019-12-16 NOTE — HISTORY OF PRESENT ILLNESS
[FreeTextEntry1] : She has a history of hypertension and hyperlipidemia.  She presented to the ED in September, feeling with a sense of lightheadedness and upper jaw tightness.   CT scan of the coronary arteries was performed - narrowing in the RCA (50-60%) was seen.  Stress isotope study showed infarct affecting the  basal, anterior, and anteroseptal regions.  A cardiac catheterization was performed by Dr. Echeverria - a 30% lesion was seen in the midportion of the LAD and a 40% lesion was seen in the proximal dominant RCA.\par \par As she returns today, she has been feeling well.  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  She describes no cardiac symptoms–there have been no episodes of exertional chest discomfort or dyspnea.  She describes no palpitations.  There have been no episodes of lightheadedness or loss of consciousness.\par \par She reminds me that she does have a propensity to skin cancers, and that hydrochlorothiazide had been previously stopped because of this.  She then became hypertensive, at which point the water pill was resumed.

## 2019-12-16 NOTE — PHYSICAL EXAM
[General Appearance - Well Developed] : well developed [Normal Appearance] : normal appearance [General Appearance - Well Nourished] : well nourished [Well Groomed] : well groomed [General Appearance - In No Acute Distress] : no acute distress [Normal Conjunctiva] : the conjunctiva exhibited no abnormalities [Eyelids - No Xanthelasma] : the eyelids demonstrated no xanthelasmas [Normal Jugular Venous A Waves Present] : normal jugular venous A waves present [Normal Jugular Venous V Waves Present] : normal jugular venous V waves present [No Jugular Venous Alex A Waves] : no jugular venous alex A waves [] : no respiratory distress [Respiration, Rhythm And Depth] : normal respiratory rhythm and effort [Auscultation Breath Sounds / Voice Sounds] : lungs were clear to auscultation bilaterally [Heart Rate And Rhythm] : heart rate and rhythm were normal [Bowel Sounds] : normal bowel sounds [Nail Clubbing] : no clubbing of the fingernails [Cyanosis, Localized] : no localized cyanosis [Skin Color & Pigmentation] : normal skin color and pigmentation [Skin Turgor] : normal skin turgor [No Venous Stasis] : no venous stasis [Skin Lesions] : no skin lesions [No Skin Ulcers] : no skin ulcer [No Xanthoma] : no  xanthoma was observed [Oriented To Time, Place, And Person] : oriented to person, place, and time [Impaired Insight] : insight and judgment were intact [Affect] : the affect was normal [Mood] : the mood was normal [FreeTextEntry1] : No bruit.  Soft, non-tender.  Liver and spleen not palpable; aortic pulsation not palpable.

## 2019-12-16 NOTE — DISCUSSION/SUMMARY
[FreeTextEntry1] : \par CAD -30% lesion in mid LAD and 40% lesion in proximal RCA at cardiac cath on September 23, 2019 by Dr. Echeverria.\par Will continue enteric-coated aspirin 81 mg daily.  Will continue statin.\par \par Hyperlipidemia -Continue statin.  Will check CMP and lipid panel with direct LDL.\par \par Hypertension - given skin cancer hx. will d/c HCTZ; will replace this with amlodipine at 2.5 mg. qd.\par \par Will check CBC per request of orthopedist (recent hairline fx.)\par \par She will return here in 2 months.

## 2019-12-18 LAB
ALBUMIN SERPL ELPH-MCNC: 4.6 G/DL
ALP BLD-CCNC: 63 U/L
ALT SERPL-CCNC: 15 U/L
ANION GAP SERPL CALC-SCNC: 14 MMOL/L
AST SERPL-CCNC: 20 U/L
BASOPHILS # BLD AUTO: 0.07 K/UL
BASOPHILS NFR BLD AUTO: 1.3 %
BILIRUB SERPL-MCNC: 1.2 MG/DL
BUN SERPL-MCNC: 16 MG/DL
CALCIUM SERPL-MCNC: 10.2 MG/DL
CHLORIDE SERPL-SCNC: 101 MMOL/L
CHOLEST SERPL-MCNC: 206 MG/DL
CHOLEST/HDLC SERPL: 2.8 RATIO
CO2 SERPL-SCNC: 26 MMOL/L
CREAT SERPL-MCNC: 0.7 MG/DL
EOSINOPHIL # BLD AUTO: 0.24 K/UL
EOSINOPHIL NFR BLD AUTO: 4.3 %
GLUCOSE SERPL-MCNC: 94 MG/DL
HCT VFR BLD CALC: 39.4 %
HDLC SERPL-MCNC: 74 MG/DL
HGB BLD-MCNC: 12.7 G/DL
IMM GRANULOCYTES NFR BLD AUTO: 0.2 %
LDLC SERPL CALC-MCNC: 110 MG/DL
LDLC SERPL DIRECT ASSAY-MCNC: 124 MG/DL
LYMPHOCYTES # BLD AUTO: 2.35 K/UL
LYMPHOCYTES NFR BLD AUTO: 42 %
MAN DIFF?: NORMAL
MCHC RBC-ENTMCNC: 31 PG
MCHC RBC-ENTMCNC: 32.2 GM/DL
MCV RBC AUTO: 96.1 FL
MONOCYTES # BLD AUTO: 0.52 K/UL
MONOCYTES NFR BLD AUTO: 9.3 %
NEUTROPHILS # BLD AUTO: 2.41 K/UL
NEUTROPHILS NFR BLD AUTO: 42.9 %
PLATELET # BLD AUTO: 267 K/UL
POTASSIUM SERPL-SCNC: 4.6 MMOL/L
PROT SERPL-MCNC: 7.2 G/DL
RBC # BLD: 4.1 M/UL
RBC # FLD: 13.5 %
SODIUM SERPL-SCNC: 141 MMOL/L
TRIGL SERPL-MCNC: 108 MG/DL
WBC # FLD AUTO: 5.6 K/UL

## 2020-02-26 NOTE — ED ADULT NURSE NOTE - MODE OF DISCHARGE
Detail Level: Detailed Quality 111:Pneumonia Vaccination Status For Older Adults: Pneumococcal Vaccination Previously Received Quality 110: Preventive Care And Screening: Influenza Immunization: Influenza Immunization Administered during Influenza season Quality 474: Zoster Vaccination Status: Shingrix Vaccination Administered or Previously Received Ambulatory

## 2020-03-02 ENCOUNTER — NON-APPOINTMENT (OUTPATIENT)
Age: 55
End: 2020-03-02

## 2020-03-02 ENCOUNTER — APPOINTMENT (OUTPATIENT)
Dept: CARDIOLOGY | Facility: CLINIC | Age: 55
End: 2020-03-02
Payer: COMMERCIAL

## 2020-03-02 VITALS
SYSTOLIC BLOOD PRESSURE: 142 MMHG | RESPIRATION RATE: 14 BRPM | HEART RATE: 62 BPM | DIASTOLIC BLOOD PRESSURE: 86 MMHG | WEIGHT: 175 LBS | OXYGEN SATURATION: 97 % | HEIGHT: 66 IN | BODY MASS INDEX: 28.12 KG/M2

## 2020-03-02 VITALS — SYSTOLIC BLOOD PRESSURE: 120 MMHG | DIASTOLIC BLOOD PRESSURE: 76 MMHG

## 2020-03-02 PROCEDURE — 99214 OFFICE O/P EST MOD 30 MIN: CPT

## 2020-03-02 PROCEDURE — 93000 ELECTROCARDIOGRAM COMPLETE: CPT

## 2020-03-02 NOTE — DISCUSSION/SUMMARY
[FreeTextEntry1] : \par Hypertension - given skin cancer, off HCTZ.  Given elevated readings at home, will increase amlodipine to 5 mg. qd.  Continue losartan at current dose.\par \par CAD -30% lesion in mid LAD and 40% lesion in proximal RCA at cardiac cath on September 23, 2019 by Dr. Echeverria.\par Will continue enteric-coated aspirin 81 mg daily.  Will continue statin.  Reviewed labs from last visit; LDL cholesterol higher than ideal.  Currently, going to weight watchers.  Will recheck  labs at next visit and will likely change to rosuvastatin.\par \par HLD - continue statin; as above. \par \par She will return here in 3 months.

## 2020-03-02 NOTE — HISTORY OF PRESENT ILLNESS
[FreeTextEntry1] : She has a history of HTN and HLD.  Last September, she was admitted to [facility] for lightheadedness and upper jaw tightness.   Cardiac CTA showed narrowing in the RCA (50-60%).  Stress isotope study showed infarct affecting the  basal, anterior, and anteroseptal regions.  Catheterization Dr. Echeverria showed a 30% lesion mid LAD and a 40% lesion in the proximal dominant RCA.\par \par I saw her last in December.  As she returns today, she continues to feel well.  She continues her usual activities, and describes no cardiac symptoms – there have been no episodes of exertional chest discomfort or dyspnea.  She describes no palpitations.  There have been no episodes of lightheadedness or loss of consciousness.\par \par She checks her blood pressure at home with a cuff and stethoscope, and brings recent readings.  In the mornings, prior to taking medications, blood pressure varies from 137–157/80–93; in the afternoons, 136-137/82-83 and in the evenings, at approximately 11 PM, 136–167/70 8–97.\par \par She is tolerating amlodipine without adverse effect.  Her other medications are unchanged.

## 2020-03-02 NOTE — PHYSICAL EXAM
[General Appearance - Well Developed] : well developed [Normal Appearance] : normal appearance [Well Groomed] : well groomed [General Appearance - Well Nourished] : well nourished [General Appearance - In No Acute Distress] : no acute distress [Normal Conjunctiva] : the conjunctiva exhibited no abnormalities [Eyelids - No Xanthelasma] : the eyelids demonstrated no xanthelasmas [Normal Jugular Venous A Waves Present] : normal jugular venous A waves present [Normal Jugular Venous V Waves Present] : normal jugular venous V waves present [No Jugular Venous Alex A Waves] : no jugular venous alex A waves [Respiration, Rhythm And Depth] : normal respiratory rhythm and effort [] : no respiratory distress [Auscultation Breath Sounds / Voice Sounds] : lungs were clear to auscultation bilaterally [Heart Rate And Rhythm] : heart rate and rhythm were normal [Bowel Sounds] : normal bowel sounds [Nail Clubbing] : no clubbing of the fingernails [Cyanosis, Localized] : no localized cyanosis [Skin Color & Pigmentation] : normal skin color and pigmentation [Skin Turgor] : normal skin turgor [No Venous Stasis] : no venous stasis [Skin Lesions] : no skin lesions [No Skin Ulcers] : no skin ulcer [No Xanthoma] : no  xanthoma was observed [Oriented To Time, Place, And Person] : oriented to person, place, and time [Impaired Insight] : insight and judgment were intact [Affect] : the affect was normal [Mood] : the mood was normal [FreeTextEntry1] : No bruit.  Soft, non-tender.  Liver and spleen not palpable; aortic pulsation not palpable.

## 2020-03-02 NOTE — ASSESSMENT
[FreeTextEntry1] : \par Hypertension\par \par Non-occlusive CAD - 30% lesion in mid LAD and 40% lesion in proximal RCA at cardiac cath on September 23, 2019 by Dr. Echeverria.\par \par Hyperlipidemia

## 2020-04-25 ENCOUNTER — MESSAGE (OUTPATIENT)
Age: 55
End: 2020-04-25

## 2020-05-04 ENCOUNTER — APPOINTMENT (OUTPATIENT)
Dept: DISASTER EMERGENCY | Facility: CLINIC | Age: 55
End: 2020-05-04

## 2020-05-06 ENCOUNTER — RX RENEWAL (OUTPATIENT)
Age: 55
End: 2020-05-06

## 2020-06-02 ENCOUNTER — APPOINTMENT (OUTPATIENT)
Dept: CARDIOLOGY | Facility: CLINIC | Age: 55
End: 2020-06-02
Payer: COMMERCIAL

## 2020-06-02 ENCOUNTER — NON-APPOINTMENT (OUTPATIENT)
Age: 55
End: 2020-06-02

## 2020-06-02 VITALS
HEART RATE: 68 BPM | RESPIRATION RATE: 14 BRPM | BODY MASS INDEX: 28.12 KG/M2 | SYSTOLIC BLOOD PRESSURE: 120 MMHG | DIASTOLIC BLOOD PRESSURE: 68 MMHG | WEIGHT: 175 LBS | HEIGHT: 66 IN

## 2020-06-02 VITALS
HEIGHT: 66 IN | WEIGHT: 175 LBS | DIASTOLIC BLOOD PRESSURE: 70 MMHG | TEMPERATURE: 96.8 F | RESPIRATION RATE: 17 BRPM | OXYGEN SATURATION: 98 % | BODY MASS INDEX: 28.12 KG/M2 | HEART RATE: 67 BPM | SYSTOLIC BLOOD PRESSURE: 112 MMHG

## 2020-06-02 PROCEDURE — 93000 ELECTROCARDIOGRAM COMPLETE: CPT

## 2020-06-02 PROCEDURE — 99214 OFFICE O/P EST MOD 30 MIN: CPT

## 2020-06-02 NOTE — DISCUSSION/SUMMARY
[FreeTextEntry1] : \par Hypertension - continue amlodipine & losartan at current dose.\par \par CAD - non- obstructive.  Continue enteric-coated aspirin 81 mg daily.  \par \par HLD - Will recheck CMP, lipids and CK; will likely change to rosuvastatin.\par \par She will return here in 4 months.

## 2020-06-02 NOTE — PHYSICAL EXAM
[General Appearance - Well Developed] : well developed [Normal Appearance] : normal appearance [Well Groomed] : well groomed [General Appearance - Well Nourished] : well nourished [General Appearance - In No Acute Distress] : no acute distress [Normal Conjunctiva] : the conjunctiva exhibited no abnormalities [Eyelids - No Xanthelasma] : the eyelids demonstrated no xanthelasmas [Normal Jugular Venous A Waves Present] : normal jugular venous A waves present [Normal Jugular Venous V Waves Present] : normal jugular venous V waves present [No Jugular Venous Alex A Waves] : no jugular venous alex A waves [] : no respiratory distress [Respiration, Rhythm And Depth] : normal respiratory rhythm and effort [Auscultation Breath Sounds / Voice Sounds] : lungs were clear to auscultation bilaterally [Heart Rate And Rhythm] : heart rate and rhythm were normal [Bowel Sounds] : normal bowel sounds [Nail Clubbing] : no clubbing of the fingernails [Cyanosis, Localized] : no localized cyanosis [Skin Color & Pigmentation] : normal skin color and pigmentation [Skin Turgor] : normal skin turgor [No Venous Stasis] : no venous stasis [Skin Lesions] : no skin lesions [No Skin Ulcers] : no skin ulcer [No Xanthoma] : no  xanthoma was observed [Oriented To Time, Place, And Person] : oriented to person, place, and time [Impaired Insight] : insight and judgment were intact [Affect] : the affect was normal [Mood] : the mood was normal [FreeTextEntry1] : No bruit.  Soft, non-tender.  Liver and spleen not palpable; aortic pulsation not palpable.

## 2020-06-02 NOTE — HISTORY OF PRESENT ILLNESS
[FreeTextEntry1] : She has a history of HTN and HLD.  In September 2019, she was admitted to [facility] for lightheadedness and upper jaw tightness.   Cardiac CTA showed narrowing in the RCA (50-60%).  Stress isotope study was read as infarct affecting the  basal, anterior, and anteroseptal regions.  Catheterization by Dr. Echeverria showed a 30% lesion mid LAD and a 40% lesion in the proximal dominant RCA.\par \par I saw her last in March,  Since that time, she has been well.  She continues her activities and is currently working at the [employer].  There have been no episodes of exertional chest discomfort or dyspnea.  She describes no palpitations.  There have been no episodes of lightheadedness or loss of consciousness.\par \par She is her meds without adverse effect; medications are unchanged.

## 2020-06-05 LAB
ALBUMIN SERPL ELPH-MCNC: 4.4 G/DL
ALP BLD-CCNC: 59 U/L
ALT SERPL-CCNC: 18 U/L
ANION GAP SERPL CALC-SCNC: 12 MMOL/L
AST SERPL-CCNC: 21 U/L
BILIRUB SERPL-MCNC: 1.2 MG/DL
BUN SERPL-MCNC: 18 MG/DL
CALCIUM SERPL-MCNC: 9.6 MG/DL
CHLORIDE SERPL-SCNC: 105 MMOL/L
CHOLEST SERPL-MCNC: 189 MG/DL
CHOLEST/HDLC SERPL: 3.1 RATIO
CK SERPL-CCNC: 151 U/L
CO2 SERPL-SCNC: 24 MMOL/L
CREAT SERPL-MCNC: 0.83 MG/DL
GLUCOSE SERPL-MCNC: 88 MG/DL
HDLC SERPL-MCNC: 61 MG/DL
LDLC SERPL CALC-MCNC: 109 MG/DL
LDLC SERPL DIRECT ASSAY-MCNC: 113 MG/DL
POTASSIUM SERPL-SCNC: 4.4 MMOL/L
PROT SERPL-MCNC: 6.6 G/DL
SODIUM SERPL-SCNC: 141 MMOL/L
TRIGL SERPL-MCNC: 91 MG/DL

## 2020-06-05 RX ORDER — SIMVASTATIN 20 MG/1
20 TABLET, FILM COATED ORAL
Qty: 90 | Refills: 1 | Status: DISCONTINUED | COMMUNITY
Start: 2020-05-07 | End: 2020-06-05

## 2020-06-08 ENCOUNTER — RX RENEWAL (OUTPATIENT)
Age: 55
End: 2020-06-08

## 2020-07-12 ENCOUNTER — RX RENEWAL (OUTPATIENT)
Age: 55
End: 2020-07-12

## 2020-10-13 ENCOUNTER — NON-APPOINTMENT (OUTPATIENT)
Age: 55
End: 2020-10-13

## 2020-10-13 ENCOUNTER — APPOINTMENT (OUTPATIENT)
Dept: CARDIOLOGY | Facility: CLINIC | Age: 55
End: 2020-10-13
Payer: COMMERCIAL

## 2020-10-13 VITALS
HEART RATE: 60 BPM | BODY MASS INDEX: 28.12 KG/M2 | SYSTOLIC BLOOD PRESSURE: 135 MMHG | DIASTOLIC BLOOD PRESSURE: 84 MMHG | HEIGHT: 66 IN | OXYGEN SATURATION: 99 % | WEIGHT: 175 LBS

## 2020-10-13 VITALS — SYSTOLIC BLOOD PRESSURE: 122 MMHG | DIASTOLIC BLOOD PRESSURE: 68 MMHG | RESPIRATION RATE: 14 BRPM

## 2020-10-13 LAB
SARS-COV-2 IGG SERPL IA-ACNC: <0.1 INDEX
SARS-COV-2 IGG SERPL QL IA: NEGATIVE

## 2020-10-13 PROCEDURE — 93000 ELECTROCARDIOGRAM COMPLETE: CPT

## 2020-10-13 PROCEDURE — 99214 OFFICE O/P EST MOD 30 MIN: CPT

## 2020-10-13 NOTE — HISTORY OF PRESENT ILLNESS
[FreeTextEntry1] : She has a history of HTN and HLD.  In September 2019, she was admitted to [facility] for lightheadedness and upper jaw tightness.   Cardiac CTA showed narrowing in the RCA (50-60%).  Stress isotope study was read as infarct affecting the  basal, anterior, and anteroseptal regions.  Catheterization by Dr. Echeverria showed a 30% lesion mid LAD and a 40% lesion in the proximal dominant RCA.\par \par Since I saw her last in June, she remains well.  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  She reports no episodes of exertional chest discomfort or dyspnea.  She describes no palpitations.  There have been no episodes of lightheadedness or loss of consciousness.\par \par She is tolerating Crestor without adverse effect.  There have been no new interval medical problems.

## 2020-10-13 NOTE — DISCUSSION/SUMMARY
[FreeTextEntry1] : \par Hypertension - continue amlodipine & losartan at current dose.\par \par CAD - non- obstructive.  Continue enteric-coated aspirin 81 mg daily and statin.  \par \par HLD - Will recheck hepatic and lipid profile.  Will call her when results are available.\par \par She will return here in 6 months.

## 2020-10-15 LAB
ALBUMIN SERPL ELPH-MCNC: 4.5 G/DL
ALP BLD-CCNC: 64 U/L
ALT SERPL-CCNC: 15 U/L
AST SERPL-CCNC: 20 U/L
BILIRUB DIRECT SERPL-MCNC: 0.3 MG/DL
BILIRUB INDIRECT SERPL-MCNC: 0.9 MG/DL
BILIRUB SERPL-MCNC: 1.2 MG/DL
CHOLEST SERPL-MCNC: 148 MG/DL
CHOLEST/HDLC SERPL: 2.3 RATIO
HDLC SERPL-MCNC: 65 MG/DL
LDLC SERPL CALC-MCNC: 70 MG/DL
LDLC SERPL DIRECT ASSAY-MCNC: 72 MG/DL
PROT SERPL-MCNC: 6.9 G/DL
TRIGL SERPL-MCNC: 70 MG/DL

## 2021-02-12 NOTE — ED PROVIDER NOTE - CARDIOVASCULAR [+], MLM
Anesthesia PreOp Note    HPI:     Keke Barker is a 46year old female who presents for preoperative consultation requested by: Arlet Cooper MD    Date of Surgery: 2/12/2021    Procedure(s):  LARYNGOSCOPY DIRECT  Indication: Odynophagia [R13.10]    Re Surgical History:   Procedure Laterality Date   • COLONOSCOPY N/A 1/8/2021    Performed by Andrea Worthy MD at [facility] ENDO   • COLONOSCOPY N/A 3/29/2017    Performed by Barry Velasquez MD at [facility] ENDOSCOPY   • ESOPHAGOGASTRODUODENOSCOPY (EGD) N/A 1/8/2021 tablet, Rfl: 1, 2/11/2021 at Unknown time    •  metFORMIN HCl 500 MG Oral Tab, Take 2 tablets (1,000 mg total) by mouth 2 (two) times daily with meals.  (Patient taking differently: Take 500 mg by mouth 2 (two) times daily with meals.  ), Disp: 360 tablet, insecurity        Worry: Not on file        Inability: Not on file      Transportation needs        Medical: Not on file        Non-medical: Not on file    Tobacco Use      Smoking status: Never Smoker      Smokeless tobacco: Never Used    Substance and Se 02/11/21  1203 02/12/21  0657   BP:  (!) 179/99   Pulse:  90   Resp:  20   Temp:  97.6 °F (36.4 °C)   TempSrc:  Oral   SpO2:  97%   Weight: 86.6 kg (191 lb) 85.3 kg (188 lb)   Height: 1.626 m (5' 4\") 1.626 m (5' 4\")        Anesthesia Evaluation     Patie chest tightness

## 2021-03-01 ENCOUNTER — LABORATORY RESULT (OUTPATIENT)
Age: 56
End: 2021-03-01

## 2021-03-10 ENCOUNTER — EMERGENCY (EMERGENCY)
Facility: HOSPITAL | Age: 56
LOS: 1 days | Discharge: HOME | End: 2021-03-10
Attending: EMERGENCY MEDICINE
Payer: COMMERCIAL

## 2021-03-10 VITALS
HEIGHT: 66 IN | OXYGEN SATURATION: 100 % | WEIGHT: 175.05 LBS | RESPIRATION RATE: 16 BRPM | DIASTOLIC BLOOD PRESSURE: 84 MMHG | SYSTOLIC BLOOD PRESSURE: 137 MMHG | HEART RATE: 68 BPM | TEMPERATURE: 98 F

## 2021-03-10 VITALS
OXYGEN SATURATION: 99 % | DIASTOLIC BLOOD PRESSURE: 78 MMHG | RESPIRATION RATE: 18 BRPM | HEART RATE: 64 BPM | SYSTOLIC BLOOD PRESSURE: 132 MMHG

## 2021-03-10 DIAGNOSIS — Z98.890 OTHER SPECIFIED POSTPROCEDURAL STATES: Chronic | ICD-10-CM

## 2021-03-10 LAB
ALBUMIN SERPL ELPH-MCNC: 4.3 G/DL — SIGNIFICANT CHANGE UP (ref 3.3–5)
ALP SERPL-CCNC: 63 U/L — SIGNIFICANT CHANGE UP (ref 40–120)
ALT FLD-CCNC: 15 U/L — SIGNIFICANT CHANGE UP (ref 10–45)
ANION GAP SERPL CALC-SCNC: 11 MMOL/L — SIGNIFICANT CHANGE UP (ref 5–17)
APTT BLD: 34.7 SEC — SIGNIFICANT CHANGE UP (ref 27.5–35.5)
AST SERPL-CCNC: 21 U/L — SIGNIFICANT CHANGE UP (ref 10–40)
BASOPHILS # BLD AUTO: 0.05 K/UL — SIGNIFICANT CHANGE UP (ref 0–0.2)
BASOPHILS NFR BLD AUTO: 0.8 % — SIGNIFICANT CHANGE UP (ref 0–2)
BILIRUB SERPL-MCNC: 0.7 MG/DL — SIGNIFICANT CHANGE UP (ref 0.2–1.2)
BUN SERPL-MCNC: 18 MG/DL — SIGNIFICANT CHANGE UP (ref 7–23)
CALCIUM SERPL-MCNC: 10.1 MG/DL — SIGNIFICANT CHANGE UP (ref 8.4–10.5)
CHLORIDE SERPL-SCNC: 105 MMOL/L — SIGNIFICANT CHANGE UP (ref 96–108)
CO2 SERPL-SCNC: 25 MMOL/L — SIGNIFICANT CHANGE UP (ref 22–31)
CREAT SERPL-MCNC: 0.7 MG/DL — SIGNIFICANT CHANGE UP (ref 0.5–1.3)
EOSINOPHIL # BLD AUTO: 0.13 K/UL — SIGNIFICANT CHANGE UP (ref 0–0.5)
EOSINOPHIL NFR BLD AUTO: 2 % — SIGNIFICANT CHANGE UP (ref 0–6)
GLUCOSE SERPL-MCNC: 93 MG/DL — SIGNIFICANT CHANGE UP (ref 70–99)
HCT VFR BLD CALC: 39.4 % — SIGNIFICANT CHANGE UP (ref 34.5–45)
HGB BLD-MCNC: 12.8 G/DL — SIGNIFICANT CHANGE UP (ref 11.5–15.5)
IMM GRANULOCYTES NFR BLD AUTO: 0.2 % — SIGNIFICANT CHANGE UP (ref 0–1.5)
INR BLD: 1 RATIO — SIGNIFICANT CHANGE UP (ref 0.88–1.16)
LYMPHOCYTES # BLD AUTO: 2.51 K/UL — SIGNIFICANT CHANGE UP (ref 1–3.3)
LYMPHOCYTES # BLD AUTO: 39.5 % — SIGNIFICANT CHANGE UP (ref 13–44)
MCHC RBC-ENTMCNC: 30.9 PG — SIGNIFICANT CHANGE UP (ref 27–34)
MCHC RBC-ENTMCNC: 32.5 GM/DL — SIGNIFICANT CHANGE UP (ref 32–36)
MCV RBC AUTO: 95.2 FL — SIGNIFICANT CHANGE UP (ref 80–100)
MONOCYTES # BLD AUTO: 0.58 K/UL — SIGNIFICANT CHANGE UP (ref 0–0.9)
MONOCYTES NFR BLD AUTO: 9.1 % — SIGNIFICANT CHANGE UP (ref 2–14)
NEUTROPHILS # BLD AUTO: 3.07 K/UL — SIGNIFICANT CHANGE UP (ref 1.8–7.4)
NEUTROPHILS NFR BLD AUTO: 48.4 % — SIGNIFICANT CHANGE UP (ref 43–77)
NRBC # BLD: 0 /100 WBCS — SIGNIFICANT CHANGE UP (ref 0–0)
PLATELET # BLD AUTO: 236 K/UL — SIGNIFICANT CHANGE UP (ref 150–400)
POTASSIUM SERPL-MCNC: 4 MMOL/L — SIGNIFICANT CHANGE UP (ref 3.5–5.3)
POTASSIUM SERPL-SCNC: 4 MMOL/L — SIGNIFICANT CHANGE UP (ref 3.5–5.3)
PROT SERPL-MCNC: 7.2 G/DL — SIGNIFICANT CHANGE UP (ref 6–8.3)
PROTHROM AB SERPL-ACNC: 12 SEC — SIGNIFICANT CHANGE UP (ref 10.6–13.6)
RBC # BLD: 4.14 M/UL — SIGNIFICANT CHANGE UP (ref 3.8–5.2)
RBC # FLD: 12.9 % — SIGNIFICANT CHANGE UP (ref 10.3–14.5)
SODIUM SERPL-SCNC: 141 MMOL/L — SIGNIFICANT CHANGE UP (ref 135–145)
TROPONIN T, HIGH SENSITIVITY RESULT: <6 NG/L — SIGNIFICANT CHANGE UP (ref 0–51)
WBC # BLD: 6.35 K/UL — SIGNIFICANT CHANGE UP (ref 3.8–10.5)
WBC # FLD AUTO: 6.35 K/UL — SIGNIFICANT CHANGE UP (ref 3.8–10.5)

## 2021-03-10 PROCEDURE — 96374 THER/PROPH/DIAG INJ IV PUSH: CPT

## 2021-03-10 PROCEDURE — 80053 COMPREHEN METABOLIC PANEL: CPT

## 2021-03-10 PROCEDURE — 84484 ASSAY OF TROPONIN QUANT: CPT

## 2021-03-10 PROCEDURE — 71046 X-RAY EXAM CHEST 2 VIEWS: CPT

## 2021-03-10 PROCEDURE — 99285 EMERGENCY DEPT VISIT HI MDM: CPT

## 2021-03-10 PROCEDURE — 85025 COMPLETE CBC W/AUTO DIFF WBC: CPT

## 2021-03-10 PROCEDURE — 93005 ELECTROCARDIOGRAM TRACING: CPT

## 2021-03-10 PROCEDURE — 85610 PROTHROMBIN TIME: CPT

## 2021-03-10 PROCEDURE — 99284 EMERGENCY DEPT VISIT MOD MDM: CPT | Mod: 25

## 2021-03-10 PROCEDURE — 85730 THROMBOPLASTIN TIME PARTIAL: CPT

## 2021-03-10 PROCEDURE — 93010 ELECTROCARDIOGRAM REPORT: CPT

## 2021-03-10 PROCEDURE — 71046 X-RAY EXAM CHEST 2 VIEWS: CPT | Mod: 26

## 2021-03-10 RX ORDER — ASPIRIN/CALCIUM CARB/MAGNESIUM 324 MG
243 TABLET ORAL ONCE
Refills: 0 | Status: COMPLETED | OUTPATIENT
Start: 2021-03-10 | End: 2021-03-10

## 2021-03-10 RX ORDER — ASPIRIN/CALCIUM CARB/MAGNESIUM 324 MG
81 TABLET ORAL ONCE
Refills: 0 | Status: DISCONTINUED | OUTPATIENT
Start: 2021-03-10 | End: 2021-03-10

## 2021-03-10 RX ORDER — FAMOTIDINE 10 MG/ML
20 INJECTION INTRAVENOUS ONCE
Refills: 0 | Status: COMPLETED | OUTPATIENT
Start: 2021-03-10 | End: 2021-03-10

## 2021-03-10 RX ADMIN — Medication 243 MILLIGRAM(S): at 21:26

## 2021-03-10 RX ADMIN — Medication 30 MILLILITER(S): at 21:26

## 2021-03-10 RX ADMIN — FAMOTIDINE 20 MILLIGRAM(S): 10 INJECTION INTRAVENOUS at 21:26

## 2021-03-10 NOTE — ED PROVIDER NOTE - CARE PROVIDER_API CALL
Rishi Ocampo (MD)  Cardiovascular Disease; Internal Medicine  [address], [facility] [address]  [place] [address]  Phone: [phone]  Fax: [phone]  Follow Up Time: 1-3 Days

## 2021-03-10 NOTE — ED PROVIDER NOTE - ATTENDING CONTRIBUTION TO CARE
56 yo female p/w CP; had cardiac cath ~1.5 years ago with no lesions requiring treatment.  EKG without STEMI here.  otherwise conversant, no distress, well-appearing.  hST negative, CXR unremarkable.  improved on my reassessment.  Not dissection, not PE.  d/w patient, options would be CDU for stress eval in AM versus d/c and f/u with her cardiologist tomorrow morning.  Patient prefers to go home; I think this is reasonable.

## 2021-03-10 NOTE — ED PROVIDER NOTE - NSFOLLOWUPINSTRUCTIONS_ED_ALL_ED_FT
CALL DOCTOR RALPH IN THE MORNING TO FOLLOW UP ON TONIGHT'S VISIT.    RETURN TO THE ED FOR ANY NEW OR WORSENING SYMPTOMS.    Chest Pain    WHAT YOU NEED TO KNOW:    Chest pain can be caused by a range of conditions, from not serious to life-threatening. Chest pain can be a symptom of a digestive problem, such as acid reflux or a stomach ulcer. An anxiety attack or a strong emotion, such as anger, can also cause chest pain. Infection, inflammation, or a fracture in the bones or cartilage in your chest can cause pain or discomfort. Sometimes chest pain or pressure is caused by poor blood flow to your heart (angina). Chest pain may also be caused by life-threatening conditions such as a heart attack or blood clot in your lungs.    DISCHARGE INSTRUCTIONS:    Call your local emergency number (911 in the [place]) or have someone call if:   •You have any of the following signs of a heart attack: ?Squeezing, pressure, or pain in your chest      ?You may also have any of the following: ?Discomfort or pain in your back, neck, jaw, stomach, or arm      ?Shortness of breath      ?Nausea or vomiting      ?Lightheadedness or a sudden cold sweat            Return to the emergency department if:   •You have chest discomfort that gets worse, even with medicine.      •You cough or vomit blood.      •Your bowel movements are black or bloody.      •You cannot stop vomiting, or it hurts to swallow.      Call your doctor if:   •You have questions or concerns about your condition or care.          Medicines:   •Medicines may be given to treat the cause of your chest pain. Examples include pain medicine, anxiety medicine, or medicines to increase blood flow to your heart.      •Do not take certain medicines without asking your healthcare provider first. These include NSAIDs, herbal or vitamin supplements, or hormones (estrogen or progestin).      •Take your medicine as directed. Contact your healthcare provider if you think your medicine is not helping or if you have side effects. Tell him or her if you are allergic to any medicine. Keep a list of the medicines, vitamins, and herbs you take. Include the amounts, and when and why you take them. Bring the list or the pill bottles to follow-up visits. Carry your medicine list with you in case of an emergency.      Healthy living tips: The following are general healthy guidelines. If the cause of your chest pain is known, your healthcare provider will give you specific guidelines to follow.  •Do not smoke. Nicotine and other chemicals in cigarettes and cigars can cause lung and heart damage. Ask your healthcare provider for information if you currently smoke and need help to quit. E-cigarettes or smokeless tobacco still contain nicotine. Talk to your healthcare provider before you use these products.      •Choose a variety of healthy foods as often as possible. Include fresh, frozen, or canned fruits and vegetables. Also include low-fat dairy products, fish, chicken (without skin), and lean meats. Your healthcare provider or a dietitian can help you create meal plans. You may need to avoid certain foods or drinks if your pain is caused by a digestion problem.  Healthy Foods           •Lower your sodium (salt) intake. Limit foods that are high in sodium, such as canned foods, salty snacks, and cold cuts. If you add salt when you cook food, do not add more at the table. Choose low-sodium canned foods as much as possible.             •Drink plenty of water every day. Water helps your body to control your temperature and blood pressure. Ask your healthcare provider how much water you should drink every day.      •Ask about activity. Your healthcare provider will tell you which activities to limit or avoid. Ask when you can drive, return to work, and have sex. Ask about the best exercise plan for you.      •Maintain a healthy weight. Ask your healthcare provider what a healthy weight is for you. Ask him or her to help you create a safe weight loss plan if you are overweight.      •Ask about vaccines you may need. Get the influenza (flu) vaccine every year as soon as recommended, usually in September or October. You may also need a pneumococcal vaccine to prevent pneumonia. The vaccine is usually given every 5 years, starting at age 65. Your healthcare provider can tell you if should get other vaccines, and when to get them.      Follow up with your healthcare provider within 72 hours, or as directed: You may need to return for more tests to find the cause of your chest pain. You may be referred to a specialist, such as a cardiologist or gastroenterologist. Write down your questions so you remember to ask them during your visits.

## 2021-03-10 NOTE — ED PROVIDER NOTE - OBJECTIVE STATEMENT
54 yo female with pmh htn presenting with concern of left sternal nonreading chest pressure x 3 days. Pain started intermittently x minutes then would go away, today returned while at work, is dull, nonexertional, nonpleuritic, nonpositional, not worse with direct palpation, no associated nausea, vomiting, diaphoresis, fevers or chills. no abdominal pain. Pt had similar pain 2 years ago and had neg, cath/echo.

## 2021-03-10 NOTE — ED ADULT NURSE NOTE - OBJECTIVE STATEMENT
Pt is a 56y/o female A&Ox3 speaking coherently ambulates independently brought in by self w/ c/o L sided chest pain x3 days. Pt states when the pain first started 3 days ago it was 1 episode of pain/discomfort a day each lasting less than 5 minutes. Pt states that today while she was at work, the pain became worse and was constant and more concentrated in the substernal area. States she "didn't feel right" and had one episode of chills so decided to come to the ER. Denies N/V/D, fevers, SOB, headache, vision changes, abdominal pain. Hx HTN & HLD. Portable cardiac monitor in place. Pt covid vaccinated. Safety and comfort measures provided. Bed locked and in lowest position, side rails up for safety.

## 2021-03-10 NOTE — ED PROVIDER NOTE - PATIENT PORTAL LINK FT
You can access the FollowMyHealth Patient Portal offered by [facility] by registering at the following website: [email or web address]. By joining [employer]’s FollowMyHealth portal, you will also be able to view your health information using other applications (apps) compatible with our system.

## 2021-04-08 ENCOUNTER — NON-APPOINTMENT (OUTPATIENT)
Age: 56
End: 2021-04-08

## 2021-04-08 ENCOUNTER — APPOINTMENT (OUTPATIENT)
Dept: CARDIOLOGY | Facility: CLINIC | Age: 56
End: 2021-04-08
Payer: COMMERCIAL

## 2021-04-08 VITALS
DIASTOLIC BLOOD PRESSURE: 70 MMHG | HEIGHT: 66 IN | HEART RATE: 59 BPM | OXYGEN SATURATION: 97 % | BODY MASS INDEX: 27.32 KG/M2 | SYSTOLIC BLOOD PRESSURE: 108 MMHG | RESPIRATION RATE: 17 BRPM | WEIGHT: 170 LBS | TEMPERATURE: 98 F

## 2021-04-08 PROCEDURE — 99072 ADDL SUPL MATRL&STAF TM PHE: CPT

## 2021-04-08 PROCEDURE — 93000 ELECTROCARDIOGRAM COMPLETE: CPT

## 2021-04-08 PROCEDURE — 99214 OFFICE O/P EST MOD 30 MIN: CPT

## 2021-04-08 PROCEDURE — 36415 COLL VENOUS BLD VENIPUNCTURE: CPT

## 2021-04-08 NOTE — HISTORY OF PRESENT ILLNESS
[FreeTextEntry1] : She has a history of HTN and HLD.  In September 2019, she was admitted to [facility] for lightheadedness and upper jaw tightness.   Cardiac CTA showed narrowing in the RCA (50-60%).  Stress isotope study was read as infarct affecting the  basal, anterior, and anteroseptal regions.  Catheterization by Dr. Echeverria showed a 30% lesion mid LAD and a 40% lesion in the proximal dominant RCA.\par \par Since I saw her last in October, she was seen in the ED at [facility] last month for L sternal BP over three days, occurring intermittently.  Troponin was neg. and EKG showed no acute changes.  CXR was neg.  I spoke to her the next day, and prescribed Pepcid, which she took for a few weeks and has since dc/'d.  Her sxs. 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  She reports no exertional chest discomfort or NUNO.  She describes no palpitations.  There have been no episodes of lightheadedness or loss of consciousness.\par \par She reports mild pedal edema with Norvasc but his has not been troublesome.

## 2021-04-08 NOTE — DISCUSSION/SUMMARY
[FreeTextEntry1] : \par Recent episode of atypical pain, ?GI cause; since resolved.\par \par HTN - continue amlodipine & losartan at current dose.\par \par CAD - non- obstructive.  Continue enteric-coated aspirin 81 mg daily and statin.  \par \par HLD - Will recheck lipid profile.  LFTs were normal on chem panel checked in ED recently.\par \par No additional cardiac testing at this time.\par \par She will return for a visit in 6 months.

## 2021-04-13 LAB
CHOLEST SERPL-MCNC: 164 MG/DL
HDLC SERPL-MCNC: 72 MG/DL
LDLC SERPL CALC-MCNC: 77 MG/DL
NONHDLC SERPL-MCNC: 92 MG/DL
TRIGL SERPL-MCNC: 71 MG/DL

## 2021-10-08 ENCOUNTER — RX RENEWAL (OUTPATIENT)
Age: 56
End: 2021-10-08

## 2021-11-01 ENCOUNTER — APPOINTMENT (OUTPATIENT)
Dept: DERMATOLOGY | Facility: CLINIC | Age: 56
End: 2021-11-01

## 2021-12-14 ENCOUNTER — NON-APPOINTMENT (OUTPATIENT)
Age: 56
End: 2021-12-14

## 2021-12-14 ENCOUNTER — APPOINTMENT (OUTPATIENT)
Dept: CARDIOLOGY | Facility: CLINIC | Age: 56
End: 2021-12-14
Payer: COMMERCIAL

## 2021-12-14 VITALS
HEIGHT: 66 IN | SYSTOLIC BLOOD PRESSURE: 120 MMHG | DIASTOLIC BLOOD PRESSURE: 74 MMHG | RESPIRATION RATE: 17 BRPM | OXYGEN SATURATION: 99 % | BODY MASS INDEX: 26.2 KG/M2 | WEIGHT: 163 LBS | HEART RATE: 64 BPM

## 2021-12-14 VITALS — SYSTOLIC BLOOD PRESSURE: 110 MMHG | DIASTOLIC BLOOD PRESSURE: 64 MMHG

## 2021-12-14 PROCEDURE — 99214 OFFICE O/P EST MOD 30 MIN: CPT

## 2021-12-14 PROCEDURE — 93000 ELECTROCARDIOGRAM COMPLETE: CPT

## 2021-12-14 PROCEDURE — 36415 COLL VENOUS BLD VENIPUNCTURE: CPT

## 2021-12-14 RX ORDER — FAMOTIDINE 40 MG/1
40 TABLET, FILM COATED ORAL DAILY
Qty: 90 | Refills: 1 | Status: DISCONTINUED | COMMUNITY
Start: 2021-03-11 | End: 2021-12-14

## 2021-12-14 NOTE — ASSESSMENT
[FreeTextEntry1] : Hypertension\par \par Non-occlusive CAD - 30% lesion in mid LAD and 40% lesion in proximal RCA at cardiac cath on September 23, 2019 by Dr. Echeverria.\par \par Hyperlipidemia

## 2021-12-14 NOTE — HISTORY OF PRESENT ILLNESS
[FreeTextEntry1] : She has a history of HTN and HLD.  In September 2019, cardiac CTA showed narrowing in the RCA (50-60%).  Stress isotope study was read as infarct affecting the  basal, anterior, and anteroseptal regions.  Catheterization by Dr. Echeverria showed a 30% lesion mid LAD and a 40% lesion in the proximal dominant RCA.\par \par Since I saw her last, she has been well.  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  She describes no episodes of exertional chest discomfort or NUNO.  There have been no palpitations, and no episodes of lightheadedness or loss of consciousness.\par \par She reports mild pedal edema with Norvasc and also thinks it is causing a degree of constipation.\par \par She reports no recurrence of the CP she had in the spring and has stopped Pepcid.

## 2021-12-14 NOTE — DISCUSSION/SUMMARY
[FreeTextEntry1] : HTN - continue losartan at current dose; will reduce Norvasc to 2.5 mg. daily given LE edema and constipation.\par \par CAD - non- obstructive.  Continue ASA 81 mg daily and statin.  \par \par HLD - continue statin.\par \par Will check blood work today. .\par \par 30 minutes spent on today's office visit.\par \par She will return for a visit in 3 months to recheck her BP on the lower dose of Norvasc.

## 2021-12-15 DIAGNOSIS — Z00.00 ENCOUNTER FOR GENERAL ADULT MEDICAL EXAMINATION W/OUT ABNORMAL FINDINGS: ICD-10-CM

## 2021-12-16 LAB
ALBUMIN SERPL ELPH-MCNC: 4.5 G/DL
ALP BLD-CCNC: 65 U/L
ALT SERPL-CCNC: 18 U/L
ANION GAP SERPL CALC-SCNC: 10 MMOL/L
AST SERPL-CCNC: 21 U/L
BASOPHILS # BLD AUTO: 0.03 K/UL
BASOPHILS NFR BLD AUTO: 0.9 %
BILIRUB SERPL-MCNC: 1.1 MG/DL
BUN SERPL-MCNC: 14 MG/DL
CALCIUM SERPL-MCNC: 9.7 MG/DL
CHLORIDE SERPL-SCNC: 103 MMOL/L
CHOLEST SERPL-MCNC: 174 MG/DL
CO2 SERPL-SCNC: 27 MMOL/L
CREAT SERPL-MCNC: 0.77 MG/DL
EOSINOPHIL # BLD AUTO: 0.09 K/UL
EOSINOPHIL NFR BLD AUTO: 2.8 %
ESTIMATED AVERAGE GLUCOSE: 108 MG/DL
GLUCOSE SERPL-MCNC: 95 MG/DL
HBA1C MFR BLD HPLC: 5.4 %
HCT VFR BLD CALC: 39.6 %
HDLC SERPL-MCNC: 80 MG/DL
HGB BLD-MCNC: 12.8 G/DL
IMM GRANULOCYTES NFR BLD AUTO: 0 %
LDLC SERPL CALC-MCNC: 83 MG/DL
LDLC SERPL DIRECT ASSAY-MCNC: 86 MG/DL
LYMPHOCYTES # BLD AUTO: 0.51 K/UL
LYMPHOCYTES NFR BLD AUTO: 15.7 %
MAN DIFF?: NORMAL
MCHC RBC-ENTMCNC: 30.8 PG
MCHC RBC-ENTMCNC: 32.3 GM/DL
MCV RBC AUTO: 95.2 FL
MONOCYTES # BLD AUTO: 0.4 K/UL
MONOCYTES NFR BLD AUTO: 12.3 %
NEUTROPHILS # BLD AUTO: 2.22 K/UL
NEUTROPHILS NFR BLD AUTO: 68.3 %
NONHDLC SERPL-MCNC: 94 MG/DL
PLATELET # BLD AUTO: 222 K/UL
POTASSIUM SERPL-SCNC: 4.1 MMOL/L
PROT SERPL-MCNC: 7 G/DL
RBC # BLD: 4.16 M/UL
RBC # FLD: 13.1 %
SARS-COV-2 N GENE NPH QL NAA+PROBE: DETECTED
SODIUM SERPL-SCNC: 139 MMOL/L
TRIGL SERPL-MCNC: 52 MG/DL
WBC # FLD AUTO: 3.25 K/UL

## 2021-12-22 ENCOUNTER — TRANSCRIPTION ENCOUNTER (OUTPATIENT)
Age: 56
End: 2021-12-22

## 2022-05-03 ENCOUNTER — APPOINTMENT (OUTPATIENT)
Dept: CARDIOLOGY | Facility: CLINIC | Age: 57
End: 2022-05-03
Payer: COMMERCIAL

## 2022-05-03 ENCOUNTER — RESULT CHARGE (OUTPATIENT)
Age: 57
End: 2022-05-03

## 2022-05-03 ENCOUNTER — NON-APPOINTMENT (OUTPATIENT)
Age: 57
End: 2022-05-03

## 2022-05-03 VITALS
WEIGHT: 165 LBS | HEIGHT: 66 IN | BODY MASS INDEX: 26.52 KG/M2 | SYSTOLIC BLOOD PRESSURE: 126 MMHG | DIASTOLIC BLOOD PRESSURE: 64 MMHG

## 2022-05-03 VITALS
WEIGHT: 165 LBS | OXYGEN SATURATION: 97 % | HEIGHT: 66 IN | HEART RATE: 59 BPM | SYSTOLIC BLOOD PRESSURE: 110 MMHG | DIASTOLIC BLOOD PRESSURE: 70 MMHG | BODY MASS INDEX: 26.52 KG/M2

## 2022-05-03 PROCEDURE — 99214 OFFICE O/P EST MOD 30 MIN: CPT

## 2022-05-03 PROCEDURE — 93000 ELECTROCARDIOGRAM COMPLETE: CPT

## 2022-05-04 NOTE — DISCUSSION/SUMMARY
[FreeTextEntry1] : HTN - remains contolled on losartan and the lesser dose of Norvasc to 2.5 mg. daily.\par \par CAD - non- obstructive.  Continue ASA 81 mg daily and statin.  Exam and EKG today unremarkable.\par \par HLD - continue statin.\par \par 32 minutes spent on today's office visit.  \par \par She will return for a visit in 4 months to recheck her BP on the lower dose of Norvasc.

## 2022-05-04 NOTE — HISTORY OF PRESENT ILLNESS
[FreeTextEntry1] : She has a history of HTN and HLD.  In September 2019, cardiac CTA showed narrowing in the RCA (50-60%).  Stress isotope study was read as infarct affecting the  basal, anterior, and anteroseptal regions.  Catheterization by Dr. 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  She describes no episodes of exertional chest discomfort, other than an episode today when dealing with a difficult patient.  SHe reports no NUNO.  There have been no palpitations, and no episodes of lightheadedness or loss of consciousness.

## 2022-05-05 LAB
ALBUMIN SERPL ELPH-MCNC: 4.5 G/DL
ALP BLD-CCNC: 58 U/L
ALT SERPL-CCNC: 20 U/L
AST SERPL-CCNC: 25 U/L
BILIRUB DIRECT SERPL-MCNC: 0.2 MG/DL
BILIRUB INDIRECT SERPL-MCNC: 0.7 MG/DL
BILIRUB SERPL-MCNC: 1 MG/DL
CHOLEST SERPL-MCNC: 184 MG/DL
CK SERPL-CCNC: 122 U/L
HDLC SERPL-MCNC: 79 MG/DL
LDLC SERPL CALC-MCNC: 88 MG/DL
LDLC SERPL DIRECT ASSAY-MCNC: 92 MG/DL
NONHDLC SERPL-MCNC: 105 MG/DL
PROT SERPL-MCNC: 6.9 G/DL
TRIGL SERPL-MCNC: 87 MG/DL

## 2022-05-11 DIAGNOSIS — J06.9 ACUTE UPPER RESPIRATORY INFECTION, UNSPECIFIED: ICD-10-CM

## 2022-06-07 ENCOUNTER — RX RENEWAL (OUTPATIENT)
Age: 57
End: 2022-06-07

## 2022-07-07 LAB
SARS-COV-2 N GENE NPH QL NAA+PROBE: DETECTED
SARS-COV-2 N GENE NPH QL NAA+PROBE: NOT DETECTED

## 2022-07-28 NOTE — H&P CARDIOLOGY - BP NONINVASIVE DIASTOLIC (MM HG)
For information on Fall & Injury Prevention, visit: [email or web address] OR  [email or web address] OR  https://www.cdc.gov/steadi/patient.html 72

## 2022-08-01 ENCOUNTER — APPOINTMENT (OUTPATIENT)
Dept: UROGYNECOLOGY | Facility: CLINIC | Age: 57
End: 2022-08-01

## 2022-08-01 VITALS
WEIGHT: 165 LBS | HEART RATE: 62 BPM | SYSTOLIC BLOOD PRESSURE: 132 MMHG | BODY MASS INDEX: 26.52 KG/M2 | HEIGHT: 66 IN | TEMPERATURE: 98.6 F | DIASTOLIC BLOOD PRESSURE: 80 MMHG

## 2022-08-01 DIAGNOSIS — N39.3 STRESS INCONTINENCE (FEMALE) (MALE): ICD-10-CM

## 2022-08-01 PROCEDURE — 99204 OFFICE O/P NEW MOD 45 MIN: CPT

## 2022-08-01 NOTE — HISTORY OF PRESENT ILLNESS
[] : years ago [Unable To Restrain Bowel Movement] : no [Urinary Frequency] : no [Urinary Tract Infection] : no [FreeTextEntry1] : \par Kiarra is a 56yo presenting with stress urinary incontinence. She was last seen in my office in 2018. \par \par PMH: HTN, HLD, non-occlusive CAD\par PSH: breast reduction\par Social: nonsmoker, employed as GYN ONC RN

## 2022-08-01 NOTE — PHYSICAL EXAM
[Chaperone Present] : A chaperone was present in the examining room during all aspects of the physical examination [Labia Majora] : were normal [Labia Minora] : were normal [Normal Appearance] : general appearance was normal [Normal] : no abnormalities [Exam Deferred] : was deferred [FreeTextEntry1] : General: Well, appearing. Alert and orientated. No acute distress\par HEENT: Normocephalic, atraumatic and extraocular muscles appear to be intact \par Neck: Full range of motion, no obvious lymphadenopathy, deformities, or masses noted \par Respiratory: Speaking in full sentences comfortably, normal work of breathing and no cough during visit\par Musculoskeletal: active full range of motion in extremities \par Extremities: No upper extremity edema noted\par Skin: no obvious rash or skin lesions\par Neuro: Orientated X 3, speech is fluent, normal rate\par Psych: Normal mood and affect  [Tenderness] : ~T no ~M abdominal tenderness observed [Distended] : not distended [de-identified] : no significant prolapse [de-identified] : supine empty VST: negative

## 2022-08-01 NOTE — DISCUSSION/SUMMARY
[FreeTextEntry1] :  We reviewed management options for stress urinary incontinence including: observation, pelvic floor exercises, continence devices, periurethral bulking agents,  and surgical management. We discussed surgical management options and written information on stress urinary incontinence including management options from [facility] was provided to her and reviewed. We reviewed risks and benefits of each procedure. She would like to try pelvic floor PT, Rx provided with contact information for local providers. In addition, she will try the revive continence device. She will RTO in 4 mo for follow up, or sooner if issues arise.

## 2022-11-28 ENCOUNTER — RX RENEWAL (OUTPATIENT)
Age: 57
End: 2022-11-28

## 2022-12-14 ENCOUNTER — NON-APPOINTMENT (OUTPATIENT)
Age: 57
End: 2022-12-14

## 2022-12-14 ENCOUNTER — APPOINTMENT (OUTPATIENT)
Dept: CARDIOLOGY | Facility: CLINIC | Age: 57
End: 2022-12-14

## 2022-12-14 VITALS
OXYGEN SATURATION: 100 % | DIASTOLIC BLOOD PRESSURE: 68 MMHG | WEIGHT: 168 LBS | SYSTOLIC BLOOD PRESSURE: 118 MMHG | HEIGHT: 66 IN | BODY MASS INDEX: 27 KG/M2 | HEART RATE: 63 BPM

## 2022-12-14 PROCEDURE — 99214 OFFICE O/P EST MOD 30 MIN: CPT

## 2022-12-14 PROCEDURE — 93000 ELECTROCARDIOGRAM COMPLETE: CPT

## 2022-12-14 PROCEDURE — 36415 COLL VENOUS BLD VENIPUNCTURE: CPT

## 2022-12-14 NOTE — HISTORY OF PRESENT ILLNESS
[FreeTextEntry1] : She has a history of HTN and HLD.  In September 2019, cardiac CTA showed narrowing in the RCA (50-60%).  Stress isotope study was read as infarct affecting the  basal, anterior, and anteroseptal regions.  Catheterization by Dr. Echeverria showed a 30% lesion mid LAD and a 40% lesion in the proximal dominant RCA.\par \par Since I saw her last, she remains well overall.  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  With her activity, she reports no exertional chest discomfort or NUNO.  She describes no palpitations or episodes of lightheadedness or loss of consciousness.\par \par She reports occasional cramping in her feet at night, perhaps once weekly.

## 2022-12-14 NOTE — DISCUSSION/SUMMARY
[EKG obtained to assist in diagnosis and management of assessed problem(s)] : EKG obtained to assist in diagnosis and management of assessed problem(s) [FreeTextEntry1] : HTN - well controlled on losartan and Norvasc.\par \par CAD - non- obstructive.  Continue ASA 81 mg daily and statin.  Exam and EKG remain unremarkable.\par \par HLD - continue statin.  Will send out blood work today. \par \par 30 minutes spent on today's office visit.  \par \par She will return for a visit in 6 months.

## 2022-12-15 ENCOUNTER — NON-APPOINTMENT (OUTPATIENT)
Age: 57
End: 2022-12-15

## 2022-12-15 LAB
ALBUMIN SERPL ELPH-MCNC: 4.7 G/DL
ALP BLD-CCNC: 61 U/L
ALT SERPL-CCNC: 19 U/L
AST SERPL-CCNC: 22 U/L
BILIRUB DIRECT SERPL-MCNC: 0.2 MG/DL
BILIRUB INDIRECT SERPL-MCNC: 1 MG/DL
BILIRUB SERPL-MCNC: 1.2 MG/DL
CHOLEST SERPL-MCNC: 175 MG/DL
CK SERPL-CCNC: 126 U/L
HDLC SERPL-MCNC: 73 MG/DL
LDLC SERPL CALC-MCNC: 88 MG/DL
NONHDLC SERPL-MCNC: 103 MG/DL
PROT SERPL-MCNC: 6.9 G/DL
TRIGL SERPL-MCNC: 71 MG/DL

## 2023-03-14 NOTE — ED CDU PROVIDER SUBSEQUENT DAY NOTE - CROS ED ENMT ALL NEG
negative...
Pre-application: Motor, sensory, and vascular responses intact in the injured extremity./Post-application: Motor, sensory, and vascular responses intact in the injured extremity./The patient/caregiver verbalized understanding of how to care for the injured extremity with splint

## 2023-04-24 NOTE — ED ADULT NURSE REASSESSMENT NOTE - REASSESS COMMUNICATION
Wt Readings from Last 3 Encounters:   04/24/23 103 kg (228 lb)   03/22/23 103 kg (228 lb)   03/16/23 101 kg (222 lb)     Discussed low salt diet  On daily torsemide  Echo scheduled in June  Sees cardiology  PA

## 2023-06-02 ENCOUNTER — RX RENEWAL (OUTPATIENT)
Age: 58
End: 2023-06-02

## 2023-06-21 ENCOUNTER — APPOINTMENT (OUTPATIENT)
Dept: CARDIOLOGY | Facility: CLINIC | Age: 58
End: 2023-06-21
Payer: COMMERCIAL

## 2023-06-21 ENCOUNTER — NON-APPOINTMENT (OUTPATIENT)
Age: 58
End: 2023-06-21

## 2023-06-21 VITALS
RESPIRATION RATE: 17 BRPM | DIASTOLIC BLOOD PRESSURE: 72 MMHG | WEIGHT: 170 LBS | HEART RATE: 56 BPM | BODY MASS INDEX: 27.32 KG/M2 | SYSTOLIC BLOOD PRESSURE: 118 MMHG | HEIGHT: 66 IN | OXYGEN SATURATION: 96 %

## 2023-06-21 PROCEDURE — 99214 OFFICE O/P EST MOD 30 MIN: CPT | Mod: 25

## 2023-06-21 PROCEDURE — 93000 ELECTROCARDIOGRAM COMPLETE: CPT

## 2023-06-21 NOTE — HISTORY OF PRESENT ILLNESS
[FreeTextEntry1] : She has a history of HTN and HLD.  In September 2019, cardiac CTA showed narrowing in the RCA (50-60%).  Stress isotope study was read as infarct affecting the  basal, anterior, and anteroseptal regions.  Catheterization by Dr. Echeverria showed a 30% lesion mid LAD and a 40% lesion in the proximal dominant RCA.\par \par Since I saw her last, she has been well.  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  She describes reports no exertional chest discomfort or NUNO.  She reports no palpitations or episodes of lightheadedness or loss of consciousness.  She reports two transient episodes of "fluttering' lasting a few seconds while watching T.V.\par \par Medications are unchanged. There have been no new interval medical problems.

## 2023-07-22 ENCOUNTER — NON-APPOINTMENT (OUTPATIENT)
Age: 58
End: 2023-07-22

## 2023-09-15 RX ORDER — AZITHROMYCIN 250 MG/1
250 TABLET, FILM COATED ORAL
Qty: 1 | Refills: 0 | Status: DISCONTINUED | COMMUNITY
Start: 2022-05-11 | End: 2023-09-15

## 2023-11-22 ENCOUNTER — APPOINTMENT (OUTPATIENT)
Dept: CARDIOLOGY | Facility: CLINIC | Age: 58
End: 2023-11-22
Payer: COMMERCIAL

## 2023-11-22 ENCOUNTER — NON-APPOINTMENT (OUTPATIENT)
Age: 58
End: 2023-11-22

## 2023-11-22 VITALS
OXYGEN SATURATION: 97 % | HEART RATE: 67 BPM | WEIGHT: 170 LBS | DIASTOLIC BLOOD PRESSURE: 74 MMHG | SYSTOLIC BLOOD PRESSURE: 114 MMHG | BODY MASS INDEX: 27.44 KG/M2

## 2023-11-22 PROCEDURE — 93000 ELECTROCARDIOGRAM COMPLETE: CPT

## 2023-11-22 PROCEDURE — 99215 OFFICE O/P EST HI 40 MIN: CPT | Mod: 25

## 2023-11-23 LAB
ALBUMIN SERPL ELPH-MCNC: 4.5 G/DL
ALP BLD-CCNC: 72 U/L
ALT SERPL-CCNC: 19 U/L
AST SERPL-CCNC: 20 U/L
BILIRUB DIRECT SERPL-MCNC: 0.3 MG/DL
BILIRUB INDIRECT SERPL-MCNC: 0.9 MG/DL
BILIRUB SERPL-MCNC: 1.1 MG/DL
CHOLEST SERPL-MCNC: 170 MG/DL
CK SERPL-CCNC: 89 U/L
HDLC SERPL-MCNC: 76 MG/DL
LDLC SERPL CALC-MCNC: 76 MG/DL
LDLC SERPL DIRECT ASSAY-MCNC: 86 MG/DL
NONHDLC SERPL-MCNC: 94 MG/DL
PROT SERPL-MCNC: 7.3 G/DL
TRIGL SERPL-MCNC: 105 MG/DL

## 2024-01-04 ENCOUNTER — APPOINTMENT (OUTPATIENT)
Dept: CARDIOLOGY | Facility: CLINIC | Age: 59
End: 2024-01-04
Payer: COMMERCIAL

## 2024-01-04 PROCEDURE — 93306 TTE W/DOPPLER COMPLETE: CPT

## 2024-01-08 ENCOUNTER — NON-APPOINTMENT (OUTPATIENT)
Age: 59
End: 2024-01-08

## 2024-02-12 ENCOUNTER — APPOINTMENT (OUTPATIENT)
Dept: CARDIOLOGY | Facility: CLINIC | Age: 59
End: 2024-02-12

## 2024-02-12 NOTE — REASON FOR VISIT
[FreeTextEntry1] : NO SHOW FOR APPOINTMENT TODAY.   Kiarra Mariola returns for f/u regarding non-occlusive CAD, HTN and HLD.

## 2024-02-12 NOTE — PHYSICAL EXAM
[General Appearance - Well Developed] : well developed [Well Groomed] : well groomed [Normal Appearance] : normal appearance [General Appearance - Well Nourished] : well nourished [General Appearance - In No Acute Distress] : no acute distress [Eyelids - No Xanthelasma] : the eyelids demonstrated no xanthelasmas [Normal Conjunctiva] : the conjunctiva exhibited no abnormalities [Normal Jugular Venous A Waves Present] : normal jugular venous A waves present [Normal Jugular Venous V Waves Present] : normal jugular venous V waves present [No Jugular Venous Alex A Waves] : no jugular venous alex A waves [] : no respiratory distress [Respiration, Rhythm And Depth] : normal respiratory rhythm and effort [Auscultation Breath Sounds / Voice Sounds] : lungs were clear to auscultation bilaterally [Bowel Sounds] : normal bowel sounds [Heart Rate And Rhythm] : heart rate and rhythm were normal [Nail Clubbing] : no clubbing of the fingernails [Cyanosis, Localized] : no localized cyanosis [Skin Color & Pigmentation] : normal skin color and pigmentation [Skin Turgor] : normal skin turgor [No Venous Stasis] : no venous stasis [Skin Lesions] : no skin lesions [No Skin Ulcers] : no skin ulcer [Oriented To Time, Place, And Person] : oriented to person, place, and time [No Xanthoma] : no  xanthoma was observed [Affect] : the affect was normal [Impaired Insight] : insight and judgment were intact [Mood] : the mood was normal [FreeTextEntry1] : No bruit.  Soft, non-tender.  Liver and spleen not palpable; aortic pulsation not palpable.

## 2024-02-12 NOTE — HISTORY OF PRESENT ILLNESS
[FreeTextEntry1] : She has a history of HTN and HLD.  In September 2019, cardiac CTA showed narrowing in the RCA (50-60%).  Stress isotope study was read as infarct affecting the  basal, anterior, and anteroseptal regions.  Catheterization by Dr. Echeverria showed a 30% lesion mid LAD and a 40% lesion in the proximal dominant RCA.  Echo on 1/4/24 showed Mild LAE and normal LV function with EF of 66%.  Mild diastolic LV dysfunction was seen.  As she returns today,    she reports episodic chest discomfort which typically last several hours.  Recently for about 36 hours she had discomfort on and off.  She takes Pepcid periodically but not daily.  She also finds work quite stressful at times, and also has chest discomfort sometimes in that circumstance.  She does not describe exertional chest discomfort she does not describe exertional dyspnea.  She reports occasional transient fluttering sensation lasting no more than a few seconds.  This does not happen more than once or twice each month.  She reports occasional mild lightheadedness.  She wonders if this might be related to the higher dose of amlodipine, although her blood pressure has been within normal range.  Medications are unchanged.

## 2024-02-12 NOTE — DISCUSSION/SUMMARY
[FreeTextEntry1] : EKG today shows:   Sinus Rhythm at 63 bpm.  Normal intervals and axis.  Unremarkable tracing; no change.  ?increase Crestor or add Zetia? LDL was 76 last NOv.   PLAN: 1.   Atypical CP -  horacio and does not seem likely to be cardiac.  I advised her to take the Pepcid every day, as I suspect this is due to acid reflux.  2. HTN - well controlled on losartan and Norvasc; I advised her to continue the current dosage.  3.  CAD - non- obstructive.  Continue ASA 81 mg daily and statin.  Exam and EKG remain unremarkable.  4.  HLD - continue statin.  Will send out blood work today to recheck hepatic chemistries, lipid panel and CK level..   GERD   41 minutes spent on today's office visit.    She will return for an office visit in 3 months.

## 2024-02-12 NOTE — ASSESSMENT
[FreeTextEntry1] : ===================== Hypertension  Non-occlusive CAD - 30% lesion in mid LAD and 40% lesion in proximal RCA at cardiac cath on September 23, 2019 by Dr. Echeverria.  In retrospect, false positive nuclear stress test.  Hyperlipidemia  GERD

## 2024-02-27 PROBLEM — K21.9 GERD (GASTROESOPHAGEAL REFLUX DISEASE): Status: ACTIVE | Noted: 2021-03-11

## 2024-02-27 PROBLEM — I25.10 CORONARY ARTERY DISEASE, NON-OCCLUSIVE: Status: ACTIVE | Noted: 2019-10-01

## 2024-02-28 ENCOUNTER — APPOINTMENT (OUTPATIENT)
Dept: CARDIOLOGY | Facility: CLINIC | Age: 59
End: 2024-02-28
Payer: COMMERCIAL

## 2024-02-28 ENCOUNTER — NON-APPOINTMENT (OUTPATIENT)
Age: 59
End: 2024-02-28

## 2024-02-28 VITALS
SYSTOLIC BLOOD PRESSURE: 130 MMHG | BODY MASS INDEX: 27.32 KG/M2 | WEIGHT: 170 LBS | HEART RATE: 60 BPM | OXYGEN SATURATION: 97 % | HEIGHT: 66 IN | DIASTOLIC BLOOD PRESSURE: 80 MMHG | RESPIRATION RATE: 15 BRPM

## 2024-02-28 DIAGNOSIS — I25.10 ATHEROSCLEROTIC HEART DISEASE OF NATIVE CORONARY ARTERY W/OUT ANGINA PECTORIS: ICD-10-CM

## 2024-02-28 DIAGNOSIS — E78.5 HYPERLIPIDEMIA, UNSPECIFIED: ICD-10-CM

## 2024-02-28 DIAGNOSIS — K21.9 GASTRO-ESOPHAGEAL REFLUX DISEASE W/OUT ESOPHAGITIS: ICD-10-CM

## 2024-02-28 DIAGNOSIS — I10 ESSENTIAL (PRIMARY) HYPERTENSION: ICD-10-CM

## 2024-02-28 PROCEDURE — 93000 ELECTROCARDIOGRAM COMPLETE: CPT

## 2024-02-28 PROCEDURE — 99214 OFFICE O/P EST MOD 30 MIN: CPT | Mod: 25

## 2024-02-28 PROCEDURE — 36415 COLL VENOUS BLD VENIPUNCTURE: CPT

## 2024-02-28 RX ORDER — ROSUVASTATIN CALCIUM 40 MG/1
40 TABLET, FILM COATED ORAL
Qty: 90 | Refills: 3 | Status: ACTIVE | COMMUNITY
Start: 2020-06-05 | End: 1900-01-01

## 2024-02-28 NOTE — HISTORY OF PRESENT ILLNESS
[FreeTextEntry1] : She has a history of HTN and HLD.  In September 2019, cardiac CTA showed narrowing in the RCA (50-60%).  Stress isotope study was read as infarct affecting the  basal, anterior, and anteroseptal regions.  Catheterization by Dr. Echeverria showed a 30% lesion mid LAD and a 40% lesion in the proximal dominant RCA.  Echo on 1/4/24 showed Mild LAE and normal LV function with EF of 66%.  Mild diastolic LV dysfunction was seen.  As she returns today, she remains active and busy at work.  She reports no recent chest discomfort & no exertional dyspnea.  There have been no recent palps or lightheadedness.  She increased amlodipine back to 5 mg. per day because she found her blood pressure was running higher when she checked it at home.

## 2024-02-28 NOTE — DISCUSSION/SUMMARY
[EKG obtained to assist in diagnosis and management of assessed problem(s)] : EKG obtained to assist in diagnosis and management of assessed problem(s) [FreeTextEntry1] : EKG today shows:    Sinus Rhythm at 60 bpm.  Normal intervals and axis.  Unremarkable tracing; no change.  PLAN: 1.   Atypical CP - likely due to GERD.  Continue Pepcid every day.  2. HTN - good controlled on current doses of losartan and Norvasc; continue same.  3.  CAD - non- obstructive.  Continue ASA 81 mg daily and statin.  Exam and EKG remain unremarkable.  4.  HLD - continue statin.  Will send out blood work today to recheck hepatic chemistries, lipid panel and CK level.   33 minutes spent on today's office visit.    She will return for an office visit in 4 months.

## 2024-02-28 NOTE — PHYSICAL EXAM
[General Appearance - Well Developed] : well developed [Normal Appearance] : normal appearance [Well Groomed] : well groomed [General Appearance - Well Nourished] : well nourished [General Appearance - In No Acute Distress] : no acute distress [Normal Conjunctiva] : the conjunctiva exhibited no abnormalities [Eyelids - No Xanthelasma] : the eyelids demonstrated no xanthelasmas [Normal Jugular Venous A Waves Present] : normal jugular venous A waves present [Normal Jugular Venous V Waves Present] : normal jugular venous V waves present [No Jugular Venous Alex A Waves] : no jugular venous alex A waves [] : no respiratory distress [Respiration, Rhythm And Depth] : normal respiratory rhythm and effort [Auscultation Breath Sounds / Voice Sounds] : lungs were clear to auscultation bilaterally [Heart Rate And Rhythm] : heart rate and rhythm were normal [Bowel Sounds] : normal bowel sounds [Nail Clubbing] : no clubbing of the fingernails [Cyanosis, Localized] : no localized cyanosis [Skin Color & Pigmentation] : normal skin color and pigmentation [Skin Turgor] : normal skin turgor [No Venous Stasis] : no venous stasis [Skin Lesions] : no skin lesions [No Skin Ulcers] : no skin ulcer [No Xanthoma] : no  xanthoma was observed [Impaired Insight] : insight and judgment were intact [Oriented To Time, Place, And Person] : oriented to person, place, and time [Affect] : the affect was normal [Mood] : the mood was normal [FreeTextEntry1] : No bruit.  Soft, non-tender.  Liver and spleen not palpable; aortic pulsation not palpable.

## 2024-02-29 LAB
ALBUMIN SERPL ELPH-MCNC: 4.7 G/DL
ALP BLD-CCNC: 67 U/L
ALT SERPL-CCNC: 23 U/L
AST SERPL-CCNC: 26 U/L
BILIRUB DIRECT SERPL-MCNC: 0.2 MG/DL
BILIRUB INDIRECT SERPL-MCNC: 0.9 MG/DL
BILIRUB SERPL-MCNC: 1.1 MG/DL
CHOLEST SERPL-MCNC: 169 MG/DL
CK SERPL-CCNC: 167 U/L
HDLC SERPL-MCNC: 82 MG/DL
LDLC SERPL CALC-MCNC: 75 MG/DL
LDLC SERPL DIRECT ASSAY-MCNC: 78 MG/DL
NONHDLC SERPL-MCNC: 87 MG/DL
PROT SERPL-MCNC: 7.6 G/DL
TRIGL SERPL-MCNC: 64 MG/DL

## 2024-03-08 LAB — SARS-COV-2 N GENE NPH QL NAA+PROBE: NOT DETECTED

## 2024-04-16 RX ORDER — LOSARTAN POTASSIUM AND HYDROCHLOROTHIAZIDE 12.5; 1 MG/1; MG/1
100-12.5 TABLET ORAL
Qty: 90 | Refills: 1 | Status: ACTIVE | COMMUNITY
Start: 2019-09-17 | End: 1900-01-01

## 2024-04-16 RX ORDER — LOSARTAN POTASSIUM 100 MG/1
100 TABLET, FILM COATED ORAL
Qty: 90 | Refills: 3 | Status: COMPLETED | COMMUNITY
Start: 2019-12-16 | End: 2024-04-16

## 2024-05-21 ENCOUNTER — RX RENEWAL (OUTPATIENT)
Age: 59
End: 2024-05-21

## 2024-05-21 RX ORDER — AMLODIPINE BESYLATE 5 MG/1
5 TABLET ORAL DAILY
Qty: 90 | Refills: 3 | Status: ACTIVE | COMMUNITY
Start: 2019-12-16 | End: 1900-01-01

## 2024-07-15 ENCOUNTER — APPOINTMENT (OUTPATIENT)
Dept: CARDIOLOGY | Facility: CLINIC | Age: 59
End: 2024-07-15
Payer: COMMERCIAL

## 2024-07-15 ENCOUNTER — NON-APPOINTMENT (OUTPATIENT)
Age: 59
End: 2024-07-15

## 2024-07-15 VITALS
SYSTOLIC BLOOD PRESSURE: 118 MMHG | DIASTOLIC BLOOD PRESSURE: 62 MMHG | BODY MASS INDEX: 28.25 KG/M2 | HEART RATE: 65 BPM | OXYGEN SATURATION: 95 % | WEIGHT: 175 LBS

## 2024-07-15 DIAGNOSIS — I10 ESSENTIAL (PRIMARY) HYPERTENSION: ICD-10-CM

## 2024-07-15 DIAGNOSIS — E78.5 HYPERLIPIDEMIA, UNSPECIFIED: ICD-10-CM

## 2024-07-15 DIAGNOSIS — I25.10 ATHEROSCLEROTIC HEART DISEASE OF NATIVE CORONARY ARTERY W/OUT ANGINA PECTORIS: ICD-10-CM

## 2024-07-15 PROCEDURE — 36415 COLL VENOUS BLD VENIPUNCTURE: CPT

## 2024-07-15 PROCEDURE — 99214 OFFICE O/P EST MOD 30 MIN: CPT | Mod: 25

## 2024-07-15 PROCEDURE — 93000 ELECTROCARDIOGRAM COMPLETE: CPT

## 2024-07-15 RX ORDER — AMLODIPINE BESYLATE 2.5 MG/1
2.5 TABLET ORAL
Qty: 90 | Refills: 0 | Status: DISCONTINUED | COMMUNITY
Start: 2023-06-02 | End: 2024-07-15

## 2024-07-15 RX ORDER — TIRBANIBULIN 10 MG/G
1 OINTMENT TOPICAL
Qty: 5 | Refills: 0 | Status: ACTIVE | COMMUNITY
Start: 2023-11-14

## 2024-07-16 LAB
ALBUMIN SERPL ELPH-MCNC: 4.6 G/DL
ALP BLD-CCNC: 60 U/L
ALT SERPL-CCNC: 19 U/L
AST SERPL-CCNC: 24 U/L
BILIRUB DIRECT SERPL-MCNC: 0.2 MG/DL
BILIRUB INDIRECT SERPL-MCNC: 0.9 MG/DL
BILIRUB SERPL-MCNC: 1.2 MG/DL
CHOLEST SERPL-MCNC: 163 MG/DL
HDLC SERPL-MCNC: 68 MG/DL
LDLC SERPL CALC-MCNC: 74 MG/DL
LDLC SERPL DIRECT ASSAY-MCNC: 74 MG/DL
NONHDLC SERPL-MCNC: 95 MG/DL
PROT SERPL-MCNC: 7.1 G/DL
TRIGL SERPL-MCNC: 117 MG/DL

## 2024-07-22 ENCOUNTER — APPOINTMENT (OUTPATIENT)
Dept: CARDIOLOGY | Facility: CLINIC | Age: 59
End: 2024-07-22

## 2024-08-20 ENCOUNTER — OFFICE (OUTPATIENT)
Dept: URBAN - METROPOLITAN AREA CLINIC 27 | Facility: CLINIC | Age: 59
Setting detail: OPHTHALMOLOGY
End: 2024-08-20
Payer: COMMERCIAL

## 2024-08-20 ENCOUNTER — RX ONLY (RX ONLY)
Age: 59
End: 2024-08-20

## 2024-08-20 DIAGNOSIS — H01.001: ICD-10-CM

## 2024-08-20 DIAGNOSIS — H01.004: ICD-10-CM

## 2024-08-20 DIAGNOSIS — H00.14: ICD-10-CM

## 2024-08-20 DIAGNOSIS — H01.005: ICD-10-CM

## 2024-08-20 DIAGNOSIS — H01.002: ICD-10-CM

## 2024-08-20 PROCEDURE — 92002 INTRM OPH EXAM NEW PATIENT: CPT | Performed by: OPHTHALMOLOGY

## 2024-08-20 ASSESSMENT — LID EXAM ASSESSMENTS
OS_BLEPHARITIS: LLL LUL T 1+
OD_BLEPHARITIS: RLL RUL T 1+

## 2024-09-16 ENCOUNTER — NON-APPOINTMENT (OUTPATIENT)
Age: 59
End: 2024-09-16

## 2024-09-17 ENCOUNTER — APPOINTMENT (OUTPATIENT)
Dept: CARDIOLOGY | Facility: CLINIC | Age: 59
End: 2024-09-17

## 2024-09-17 VITALS
OXYGEN SATURATION: 96 % | BODY MASS INDEX: 27.4 KG/M2 | HEIGHT: 69 IN | SYSTOLIC BLOOD PRESSURE: 120 MMHG | DIASTOLIC BLOOD PRESSURE: 70 MMHG | HEART RATE: 66 BPM | WEIGHT: 185 LBS

## 2024-09-17 DIAGNOSIS — E78.5 HYPERLIPIDEMIA, UNSPECIFIED: ICD-10-CM

## 2024-09-17 PROCEDURE — 99214 OFFICE O/P EST MOD 30 MIN: CPT | Mod: 25

## 2024-09-17 PROCEDURE — G2211 COMPLEX E/M VISIT ADD ON: CPT | Mod: NC

## 2024-09-17 PROCEDURE — 99401 PREV MED CNSL INDIV APPRX 15: CPT

## 2024-09-20 ENCOUNTER — APPOINTMENT (OUTPATIENT)
Dept: CARDIOLOGY | Facility: CLINIC | Age: 59
End: 2024-09-20
Payer: COMMERCIAL

## 2024-09-20 ENCOUNTER — NON-APPOINTMENT (OUTPATIENT)
Age: 59
End: 2024-09-20

## 2024-09-20 VITALS
HEIGHT: 69 IN | BODY MASS INDEX: 27.4 KG/M2 | RESPIRATION RATE: 14 BRPM | WEIGHT: 185 LBS | DIASTOLIC BLOOD PRESSURE: 78 MMHG | OXYGEN SATURATION: 98 % | HEART RATE: 71 BPM | SYSTOLIC BLOOD PRESSURE: 136 MMHG

## 2024-09-20 DIAGNOSIS — R07.89 OTHER CHEST PAIN: ICD-10-CM

## 2024-09-20 DIAGNOSIS — I25.10 ATHEROSCLEROTIC HEART DISEASE OF NATIVE CORONARY ARTERY W/OUT ANGINA PECTORIS: ICD-10-CM

## 2024-09-20 DIAGNOSIS — R06.00 DYSPNEA, UNSPECIFIED: ICD-10-CM

## 2024-09-20 DIAGNOSIS — I10 ESSENTIAL (PRIMARY) HYPERTENSION: ICD-10-CM

## 2024-09-20 PROCEDURE — 36415 COLL VENOUS BLD VENIPUNCTURE: CPT

## 2024-09-20 PROCEDURE — 99214 OFFICE O/P EST MOD 30 MIN: CPT | Mod: 25

## 2024-09-20 PROCEDURE — 93000 ELECTROCARDIOGRAM COMPLETE: CPT

## 2024-09-20 NOTE — DISCUSSION/SUMMARY
[FreeTextEntry1] : EKG today shows:   Sinus Rhythm at 66 bpm.  Normal intervals and axis.  Unremarkable tracing; no change.  PLAN: 1.   Atypical CP - likely due to GERD.   Less likely due to cardiac origin, although she does have a history of nonobstructive CAD, last evaluated in 2019. - Continue Pepcid qd. -   Will clarify if her insurance would permit us to do a cardiac CT angiogram; if not, we will request a nuclear stress test given her current symptoms of shortness of breath and chest discomfort and known coronary artery disease. -   She recently returned from [place], where she had visited her daughter who is a [employer] based in [place].  While pulmonary embolism was seem an unlikely cause of her symptoms, as they were present prior to her trip and she does take low-dose aspirin daily, will check fibrin split products for thoroughness sake. -   Will also check a CBC and comprehensive profile.  2. HTN -in acceptable range today.   -   Continue Norvasc and losartan HCT at present dosage.    3.  CAD -  - continue ASA 81 mg daily and statin.   4.  HLD - continue statin.  - recent blood work was reasonable.  33 minutes spent on today's office visit.    Will call her when her test results are available.  Will also speak to her once we clarify what her insurance will cover in terms of ischemic testing.  She also has an appointment in December which she will plan on keeping. [EKG obtained to assist in diagnosis and management of assessed problem(s)] : EKG obtained to assist in diagnosis and management of assessed problem(s)

## 2024-09-20 NOTE — DISCUSSION/SUMMARY
[FreeTextEntry1] : EKG today shows:   Sinus Rhythm at 66 bpm.  Normal intervals and axis.  Unremarkable tracing; no change.  PLAN: 1.   Atypical CP - likely due to GERD.   Less likely due to cardiac origin, although she does have a history of nonobstructive CAD, last evaluated in 2019. - Continue Pepcid qd. -   Will clarify if her insurance would permit us to do a cardiac CT angiogram; if not, we will request a nuclear stress test given her current symptoms of shortness of breath and chest discomfort and known coronary artery disease. -   She recently returned from [place], where she had visited her daughter who is a [employer] based in [place].  While pulmonary embolism was seem an unlikely cause of her symptoms, as they were present prior to her trip and she does take low-dose aspirin daily, will check fibrin split products for thoroughness sake. -   Will also check a CBC and comprehensive profile.  2. HTN -in acceptable range today.   -   Continue Norvasc and losartan HCT at present dosage.    3.  CAD -  - continue ASA 81 mg daily and statin.   4.  HLD - continue statin.  - recent blood work was reasonable.  33 minutes spent on today's office visit.    Will call her when her test results are available.  Will also speak to her once we clarify what her insurance will cover in terms of ischemic testing.  She also has an appointment in December which she will plan on keeping. [EKG obtained to assist in diagnosis and management of assessed problem(s)] : EKG obtained to assist in diagnosis and management of assessed problem(s)

## 2024-09-20 NOTE — REASON FOR VISIT
[FreeTextEntry1] :   Kiarra Garnett returns for f/u regarding dyspnea and atypical chest pain; she has a history of non-occlusive CAD, HTN and HLD.

## 2024-09-20 NOTE — HISTORY OF PRESENT ILLNESS
[FreeTextEntry1] : She has a history of HTN and HLD.  In September 2019, cardiac CTA showed narrowing in the RCA (50-60%).  Stress isotope study was read as infarct affecting the  basal, anterior, and anteroseptal regions.  Catheterization by Dr. Echeverria showed a 30% lesion mid LAD and a 40% lesion in the proximal dominant RCA.  Echo on 1/4/24 showed Mild LAE and normal LV function with EF of 66%.  Mild diastolic LV dysfunction was seen.  As she returns today, she reports that over the past few weeks, she has a sense of breathlessness at times.  She describes this as a feeling that she cannot take a full breath.  She is able to continue her usual activities and busy work today, but sometimes with walking, she also has a feeling of breathlessness which resolves when she stops.  At other times, she has a sense of tightness; this can last for extended periods of time and does not seem to consistently be related to physical activity.  She reports no recent palps or lightheadedness.    Medications are unchanged.

## 2024-09-24 LAB
ALBUMIN SERPL ELPH-MCNC: 4.6 G/DL
ALP BLD-CCNC: 63 U/L
ALT SERPL-CCNC: 20 U/L
ANION GAP SERPL CALC-SCNC: 15 MMOL/L
AST SERPL-CCNC: 23 U/L
BILIRUB SERPL-MCNC: 1.3 MG/DL
BUN SERPL-MCNC: 16 MG/DL
CALCIUM SERPL-MCNC: 10.1 MG/DL
CHLORIDE SERPL-SCNC: 101 MMOL/L
CO2 SERPL-SCNC: 26 MMOL/L
CREAT SERPL-MCNC: 0.79 MG/DL
EGFR: 86 ML/MIN/1.73M2
FSP TITR PPP LA: < 5 UG/ML
GLUCOSE SERPL-MCNC: 105 MG/DL
HCT VFR BLD CALC: 39.5 %
HGB BLD-MCNC: 13.2 G/DL
MCHC RBC-ENTMCNC: 31.5 PG
MCHC RBC-ENTMCNC: 33.4 GM/DL
MCV RBC AUTO: 94.3 FL
PLATELET # BLD AUTO: 250 K/UL
POTASSIUM SERPL-SCNC: 3.9 MMOL/L
PROT SERPL-MCNC: 7.4 G/DL
RBC # BLD: 4.19 M/UL
RBC # FLD: 13.2 %
SODIUM SERPL-SCNC: 141 MMOL/L
TSH SERPL-ACNC: 3.28 UIU/ML
WBC # FLD AUTO: 6.75 K/UL

## 2024-10-02 ENCOUNTER — APPOINTMENT (OUTPATIENT)
Dept: CARDIOLOGY | Facility: CLINIC | Age: 59
End: 2024-10-02

## 2024-10-27 ENCOUNTER — NON-APPOINTMENT (OUTPATIENT)
Age: 59
End: 2024-10-27

## 2024-10-31 ENCOUNTER — OUTPATIENT (OUTPATIENT)
Dept: OUTPATIENT SERVICES | Facility: HOSPITAL | Age: 59
LOS: 1 days | End: 2024-10-31
Payer: COMMERCIAL

## 2024-10-31 ENCOUNTER — APPOINTMENT (OUTPATIENT)
Dept: CT IMAGING | Facility: CLINIC | Age: 59
End: 2024-10-31
Payer: COMMERCIAL

## 2024-10-31 DIAGNOSIS — R07.89 OTHER CHEST PAIN: ICD-10-CM

## 2024-10-31 DIAGNOSIS — I10 ESSENTIAL (PRIMARY) HYPERTENSION: ICD-10-CM

## 2024-10-31 DIAGNOSIS — I25.10 ATHEROSCLEROTIC HEART DISEASE OF NATIVE CORONARY ARTERY WITHOUT ANGINA PECTORIS: ICD-10-CM

## 2024-10-31 DIAGNOSIS — E78.5 HYPERLIPIDEMIA, UNSPECIFIED: ICD-10-CM

## 2024-10-31 DIAGNOSIS — Z98.890 OTHER SPECIFIED POSTPROCEDURAL STATES: Chronic | ICD-10-CM

## 2024-10-31 PROCEDURE — 75574 CT ANGIO HRT W/3D IMAGE: CPT | Mod: 26

## 2024-10-31 PROCEDURE — 75574 CT ANGIO HRT W/3D IMAGE: CPT

## 2024-11-06 ENCOUNTER — APPOINTMENT (OUTPATIENT)
Dept: CARDIOLOGY | Facility: CLINIC | Age: 59
End: 2024-11-06
Payer: COMMERCIAL

## 2024-11-06 VITALS
HEART RATE: 71 BPM | BODY MASS INDEX: 27.4 KG/M2 | DIASTOLIC BLOOD PRESSURE: 70 MMHG | WEIGHT: 185 LBS | SYSTOLIC BLOOD PRESSURE: 114 MMHG | HEIGHT: 69 IN | OXYGEN SATURATION: 98 %

## 2024-11-06 DIAGNOSIS — E66.9 OBESITY, UNSPECIFIED: ICD-10-CM

## 2024-11-06 DIAGNOSIS — E78.5 HYPERLIPIDEMIA, UNSPECIFIED: ICD-10-CM

## 2024-11-06 DIAGNOSIS — I25.10 ATHEROSCLEROTIC HEART DISEASE OF NATIVE CORONARY ARTERY W/OUT ANGINA PECTORIS: ICD-10-CM

## 2024-11-06 PROCEDURE — G2211 COMPLEX E/M VISIT ADD ON: CPT | Mod: NC

## 2024-11-06 PROCEDURE — 99215 OFFICE O/P EST HI 40 MIN: CPT

## 2024-11-06 RX ORDER — TIRZEPATIDE 2.5 MG/.5ML
2.5 INJECTION, SOLUTION SUBCUTANEOUS
Qty: 1 | Refills: 1 | Status: ACTIVE | COMMUNITY
Start: 2024-11-06 | End: 1900-01-01

## 2024-11-07 ENCOUNTER — APPOINTMENT (OUTPATIENT)
Dept: CARDIOLOGY | Facility: CLINIC | Age: 59
End: 2024-11-07

## 2024-12-06 ENCOUNTER — APPOINTMENT (OUTPATIENT)
Dept: CARDIOLOGY | Facility: CLINIC | Age: 59
End: 2024-12-06

## 2024-12-09 ENCOUNTER — APPOINTMENT (OUTPATIENT)
Dept: CARDIOLOGY | Facility: CLINIC | Age: 59
End: 2024-12-09

## 2025-01-23 ENCOUNTER — NON-APPOINTMENT (OUTPATIENT)
Age: 60
End: 2025-01-23

## 2025-01-29 ENCOUNTER — TRANSCRIPTION ENCOUNTER (OUTPATIENT)
Age: 60
End: 2025-01-29

## 2025-01-31 ENCOUNTER — TRANSCRIPTION ENCOUNTER (OUTPATIENT)
Age: 60
End: 2025-01-31

## 2025-02-03 RX ORDER — TIRZEPATIDE 2.5 MG/.5ML
2.5 INJECTION, SOLUTION SUBCUTANEOUS
Qty: 1 | Refills: 0 | Status: ACTIVE | COMMUNITY
Start: 2025-02-03 | End: 1900-01-01

## 2025-02-06 ENCOUNTER — NON-APPOINTMENT (OUTPATIENT)
Age: 60
End: 2025-02-06

## 2025-02-06 ENCOUNTER — TRANSCRIPTION ENCOUNTER (OUTPATIENT)
Age: 60
End: 2025-02-06

## 2025-02-06 ENCOUNTER — APPOINTMENT (OUTPATIENT)
Dept: CARDIOLOGY | Facility: CLINIC | Age: 60
End: 2025-02-06
Payer: COMMERCIAL

## 2025-02-06 VITALS
WEIGHT: 181 LBS | DIASTOLIC BLOOD PRESSURE: 66 MMHG | HEART RATE: 60 BPM | SYSTOLIC BLOOD PRESSURE: 104 MMHG | BODY MASS INDEX: 29.21 KG/M2

## 2025-02-06 PROCEDURE — 99214 OFFICE O/P EST MOD 30 MIN: CPT

## 2025-02-06 PROCEDURE — G2211 COMPLEX E/M VISIT ADD ON: CPT | Mod: NC

## 2025-02-06 PROCEDURE — 36415 COLL VENOUS BLD VENIPUNCTURE: CPT

## 2025-02-06 PROCEDURE — 93000 ELECTROCARDIOGRAM COMPLETE: CPT

## 2025-02-06 RX ORDER — TIRZEPATIDE 2.5 MG/.5ML
2.5 INJECTION, SOLUTION SUBCUTANEOUS
Qty: 1 | Refills: 1 | Status: ACTIVE | COMMUNITY
Start: 2025-02-06 | End: 1900-01-01

## 2025-02-07 LAB
ALBUMIN SERPL ELPH-MCNC: 4.3 G/DL
ALP BLD-CCNC: 62 U/L
ALT SERPL-CCNC: 22 U/L
AST SERPL-CCNC: 23 U/L
BILIRUB DIRECT SERPL-MCNC: 0.2 MG/DL
BILIRUB INDIRECT SERPL-MCNC: 0.7 MG/DL
BILIRUB SERPL-MCNC: 1 MG/DL
CHOLEST SERPL-MCNC: 162 MG/DL
CK SERPL-CCNC: 103 U/L
HDLC SERPL-MCNC: 63 MG/DL
LDLC SERPL CALC-MCNC: 80 MG/DL
LDLC SERPL DIRECT ASSAY-MCNC: 83 MG/DL
NONHDLC SERPL-MCNC: 99 MG/DL
PROT SERPL-MCNC: 6.8 G/DL
TRIGL SERPL-MCNC: 105 MG/DL

## 2025-02-07 RX ORDER — EZETIMIBE 10 MG/1
10 TABLET ORAL
Qty: 90 | Refills: 3 | Status: ACTIVE | COMMUNITY
Start: 2025-02-07 | End: 1900-01-01

## 2025-02-24 ENCOUNTER — APPOINTMENT (OUTPATIENT)
Dept: CARDIOLOGY | Facility: CLINIC | Age: 60
End: 2025-02-24

## 2025-02-26 ENCOUNTER — APPOINTMENT (OUTPATIENT)
Dept: CARDIOLOGY | Facility: CLINIC | Age: 60
End: 2025-02-26
Payer: COMMERCIAL

## 2025-02-26 VITALS
HEART RATE: 63 BPM | BODY MASS INDEX: 27.44 KG/M2 | OXYGEN SATURATION: 97 % | DIASTOLIC BLOOD PRESSURE: 67 MMHG | WEIGHT: 170 LBS | SYSTOLIC BLOOD PRESSURE: 120 MMHG

## 2025-02-26 DIAGNOSIS — I25.10 ATHEROSCLEROTIC HEART DISEASE OF NATIVE CORONARY ARTERY W/OUT ANGINA PECTORIS: ICD-10-CM

## 2025-02-26 DIAGNOSIS — I10 ESSENTIAL (PRIMARY) HYPERTENSION: ICD-10-CM

## 2025-02-26 DIAGNOSIS — R07.89 OTHER CHEST PAIN: ICD-10-CM

## 2025-02-26 DIAGNOSIS — E78.5 HYPERLIPIDEMIA, UNSPECIFIED: ICD-10-CM

## 2025-02-26 PROCEDURE — 99214 OFFICE O/P EST MOD 30 MIN: CPT | Mod: 25

## 2025-02-26 PROCEDURE — 99402 PREV MED CNSL INDIV APPRX 30: CPT

## 2025-02-26 PROCEDURE — G0537: CPT

## 2025-03-05 ENCOUNTER — RX RENEWAL (OUTPATIENT)
Age: 60
End: 2025-03-05

## 2025-04-28 ENCOUNTER — NON-APPOINTMENT (OUTPATIENT)
Age: 60
End: 2025-04-28

## 2025-04-30 ENCOUNTER — APPOINTMENT (OUTPATIENT)
Dept: CARDIOLOGY | Facility: CLINIC | Age: 60
End: 2025-04-30
Payer: COMMERCIAL

## 2025-04-30 DIAGNOSIS — E78.5 HYPERLIPIDEMIA, UNSPECIFIED: ICD-10-CM

## 2025-04-30 DIAGNOSIS — I25.10 ATHEROSCLEROTIC HEART DISEASE OF NATIVE CORONARY ARTERY W/OUT ANGINA PECTORIS: ICD-10-CM

## 2025-04-30 DIAGNOSIS — I10 ESSENTIAL (PRIMARY) HYPERTENSION: ICD-10-CM

## 2025-04-30 PROCEDURE — 99214 OFFICE O/P EST MOD 30 MIN: CPT | Mod: 95

## 2025-04-30 PROCEDURE — G2211 COMPLEX E/M VISIT ADD ON: CPT | Mod: NC,95

## 2025-05-01 ENCOUNTER — RX RENEWAL (OUTPATIENT)
Age: 60
End: 2025-05-01

## 2025-05-08 RX ORDER — LOSARTAN POTASSIUM 100 MG/1
100 TABLET, FILM COATED ORAL DAILY
Qty: 90 | Refills: 3 | Status: ACTIVE | COMMUNITY
Start: 2025-04-30 | End: 1900-01-01

## 2025-05-11 ENCOUNTER — NON-APPOINTMENT (OUTPATIENT)
Age: 60
End: 2025-05-11

## 2025-06-23 ENCOUNTER — RX RENEWAL (OUTPATIENT)
Age: 60
End: 2025-06-23

## 2025-08-14 ENCOUNTER — APPOINTMENT (OUTPATIENT)
Dept: CARDIOLOGY | Facility: CLINIC | Age: 60
End: 2025-08-14
Payer: COMMERCIAL

## 2025-08-14 PROCEDURE — G2211 COMPLEX E/M VISIT ADD ON: CPT | Mod: NC,95

## 2025-08-14 PROCEDURE — 99214 OFFICE O/P EST MOD 30 MIN: CPT | Mod: 95

## 2025-08-19 ENCOUNTER — APPOINTMENT (OUTPATIENT)
Dept: CARDIOLOGY | Facility: CLINIC | Age: 60
End: 2025-08-19

## 2025-08-20 ENCOUNTER — APPOINTMENT (OUTPATIENT)
Dept: CARDIOLOGY | Facility: CLINIC | Age: 60
End: 2025-08-20
Payer: COMMERCIAL

## 2025-08-20 ENCOUNTER — NON-APPOINTMENT (OUTPATIENT)
Age: 60
End: 2025-08-20

## 2025-08-20 VITALS
SYSTOLIC BLOOD PRESSURE: 118 MMHG | DIASTOLIC BLOOD PRESSURE: 80 MMHG | BODY MASS INDEX: 25.18 KG/M2 | OXYGEN SATURATION: 97 % | HEART RATE: 70 BPM | WEIGHT: 156 LBS

## 2025-08-20 DIAGNOSIS — E78.5 HYPERLIPIDEMIA, UNSPECIFIED: ICD-10-CM

## 2025-08-20 DIAGNOSIS — I25.10 ATHEROSCLEROTIC HEART DISEASE OF NATIVE CORONARY ARTERY W/OUT ANGINA PECTORIS: ICD-10-CM

## 2025-08-20 DIAGNOSIS — R07.89 OTHER CHEST PAIN: ICD-10-CM

## 2025-08-20 DIAGNOSIS — I10 ESSENTIAL (PRIMARY) HYPERTENSION: ICD-10-CM

## 2025-08-20 PROCEDURE — 36415 COLL VENOUS BLD VENIPUNCTURE: CPT

## 2025-08-20 PROCEDURE — G2211 COMPLEX E/M VISIT ADD ON: CPT | Mod: NC

## 2025-08-20 PROCEDURE — 93000 ELECTROCARDIOGRAM COMPLETE: CPT

## 2025-08-20 PROCEDURE — 99214 OFFICE O/P EST MOD 30 MIN: CPT

## 2025-08-21 LAB
ALBUMIN SERPL ELPH-MCNC: 4.2 G/DL
ALBUMIN SERPL ELPH-MCNC: 4.3 G/DL
ALP BLD-CCNC: 66 U/L
ALP BLD-CCNC: 68 U/L
ALT SERPL-CCNC: 60 U/L
ALT SERPL-CCNC: 62 U/L
ANION GAP SERPL CALC-SCNC: 15 MMOL/L
AST SERPL-CCNC: 49 U/L
AST SERPL-CCNC: 52 U/L
BILIRUB DIRECT SERPL-MCNC: 0.44 MG/DL
BILIRUB INDIRECT SERPL-MCNC: 1.2 MG/DL
BILIRUB SERPL-MCNC: 1.6 MG/DL
BILIRUB SERPL-MCNC: 1.6 MG/DL
BUN SERPL-MCNC: 15 MG/DL
CALCIUM SERPL-MCNC: 9.8 MG/DL
CHLORIDE SERPL-SCNC: 103 MMOL/L
CHOLEST SERPL-MCNC: 134 MG/DL
CK SERPL-CCNC: 189 U/L
CO2 SERPL-SCNC: 25 MMOL/L
CREAT SERPL-MCNC: 0.79 MG/DL
EGFRCR SERPLBLD CKD-EPI 2021: 85 ML/MIN/1.73M2
GLUCOSE SERPL-MCNC: 83 MG/DL
HDLC SERPL-MCNC: 56 MG/DL
LDLC SERPL DIRECT ASSAY-MCNC: 61 MG/DL
LDLC SERPL-MCNC: 65 MG/DL
NONHDLC SERPL-MCNC: 78 MG/DL
POTASSIUM SERPL-SCNC: 4.7 MMOL/L
PROT SERPL-MCNC: 6.7 G/DL
PROT SERPL-MCNC: 6.9 G/DL
SODIUM SERPL-SCNC: 142 MMOL/L
TRIGL SERPL-MCNC: 60 MG/DL

## 2025-08-27 ENCOUNTER — TRANSCRIPTION ENCOUNTER (OUTPATIENT)
Age: 60
End: 2025-08-27